# Patient Record
Sex: MALE | Race: BLACK OR AFRICAN AMERICAN | Employment: OTHER | URBAN - METROPOLITAN AREA
[De-identification: names, ages, dates, MRNs, and addresses within clinical notes are randomized per-mention and may not be internally consistent; named-entity substitution may affect disease eponyms.]

---

## 2017-03-23 ENCOUNTER — PRE VISIT (OUTPATIENT)
Dept: UROLOGY | Facility: CLINIC | Age: 52
End: 2017-03-23

## 2017-03-31 ENCOUNTER — OFFICE VISIT (OUTPATIENT)
Dept: UROLOGY | Facility: CLINIC | Age: 52
End: 2017-03-31

## 2017-03-31 VITALS
BODY MASS INDEX: 27.77 KG/M2 | DIASTOLIC BLOOD PRESSURE: 74 MMHG | WEIGHT: 183.2 LBS | SYSTOLIC BLOOD PRESSURE: 133 MMHG | HEART RATE: 70 BPM | HEIGHT: 68 IN

## 2017-03-31 DIAGNOSIS — R35.1 NOCTURIA: ICD-10-CM

## 2017-03-31 DIAGNOSIS — R97.20 ELEVATED PROSTATE SPECIFIC ANTIGEN (PSA): ICD-10-CM

## 2017-03-31 DIAGNOSIS — N40.0 ENLARGED PROSTATE: ICD-10-CM

## 2017-03-31 DIAGNOSIS — N40.0 ENLARGED PROSTATE: Primary | ICD-10-CM

## 2017-03-31 LAB — PSA SERPL-MCNC: 8.23 UG/L (ref 0–4)

## 2017-03-31 ASSESSMENT — PAIN SCALES - GENERAL: PAINLEVEL: NO PAIN (0)

## 2017-03-31 ASSESSMENT — ENCOUNTER SYMPTOMS: DIFFICULTY URINATING: 1

## 2017-03-31 NOTE — MR AVS SNAPSHOT
After Visit Summary   3/31/2017    Chaitanya Ovalles    MRN: [id number]           Patient Information     Date Of Birth          1965        Visit Information        Provider Department      3/31/2017 4:00 PM Nadine Mena MD [facility] and [facility] Prostate and Urologic C[facility]        Today's Diagnoses     Enlarged prostate    -  1    Elevated prostate specific antigen (PSA)        Nocturia          Care Instructions    PSA today. Schedule a prostate MRI and follow up with a cystoscopy.              Follow-ups after your visit        Your next 10 appointments already scheduled     Mar 31, 2017  5:30 PM CDT   LAB with  LAB   [facility] ([facility])    [address] [place] [id number] [phone]           Patient must bring picture ID.  Patient should be prepared to give a urine specimen  Please do not eat 10-12 hours before your appointment if you are coming in fasting for labs on lipids, cholesterol, or glucose (sugar).  Pregnant women should follow their Care Team instructions. Water with medications is okay. Do not drink coffee or other fluids.   If you have concerns about taking  your medications, please ask at office or if scheduling via Twyxt, send a message by clicking on Secure Messaging, Message Your Care Team.            Apr 06, 2017  7:00 PM CDT   (Arrive by 6:45 PM)   MR PROSTATE with [id number]   [facility] [place] [facility] ([facility])    [address] [place] [id number] [phone]           Take your medicines as usual, unless your doctor tells you not to. Bring a list of your current medicines to your exam (including vitamins, minerals and over-the-counter drugs). Also bring the results of similar scans you may have had.  Please remove any body piercings and hair extensions before you arrive.  Follow your doctor s orders. If you do not, we may have to  postpone your exam.  You will not have contrast for this exam. You do not need to do anything special to prepare.  The MRI machine uses a strong magnet. Please wear clothes without metal (snaps, zippers). A sweatsuit works well, or we may give you a hospital gown.   **IMPORTANT** THE INSTRUCTIONS BELOW ARE ONLY FOR THOSE PATIENTS WHO HAVE BEEN TOLD THEY WILL RECEIVE SEDATION OR GENERAL ANESTHESIA DURING THEIR MRI PROCEDURE:  IF YOU WILL RECEIVE SEDATION (take medicine to help you relax during your exam):   You must get the medicine from your doctor before you arrive. Bring the medicine to the exam. Do not take it at home.   Arrive one hour early. Bring someone who can take you home after the test. Your medicine will make you sleepy. After the exam, you may not drive, take a bus or take a taxi by yourself.   No eating 8 hours before your exam. You may have clear liquids up until 4 hours before your exam. (Clear liquids include water, clear tea, black coffee and fruit juice without pulp.)  IF YOU WILL RECEIVE ANESTHESIA (be asleep for your exam):   Arrive 1 1/2 hours early. Bring someone who can take you home after the test. You may not drive, take a bus or take a taxi by yourself.   No eating 8 hours before your exam. You may have clear liquids up until 4 hours before your exam. (Clear liquids include water, clear tea, black coffee and fruit juice without pulp.)   You will spend four to five hours in the recovery room.  Please call the Imaging Department at your exam site with any questions.            May 19, 2017  4:45 PM CDT   (Arrive by 4:30 PM)   Cystoscopy with Nadine Mena MD   [facility] and [facility] for Prostate and Urologic Cancers ([facility])    [address] [facility] [address] [phone]              Future tests that were ordered for you today     Open Future Orders        Priority Expected Expires Ordered    PSA tumor marker Routine   "3/31/2018 3/31/2017    MRI Prostate Routine  3/31/2018 3/31/2017            Who to contact     Please call your clinic at [phone] to:    Ask questions about your health    Make or cancel appointments    Discuss your medicines    Learn about your test results    Speak to your doctor   If you have compliments or concerns about an experience at your clinic, or if you wish to file a complaint, please contact [facility] Patient Relations at [phone] or email us at [email or web address]         Additional Information About Your Visit        [facility] [employer] [facility] Information     [employer] is an electronic gateway that provides easy, online access to your medical records. With [employer], you can request a clinic appointment, read your test results, renew a prescription or communicate with your care team.     To sign up for [employer] visit the website at [email or web address]   You will be asked to enter the access code listed below, as well as some personal information. Please follow the directions to create your username and password.     Your access code is: [email or web address]  Expires: 6/15/2017  6:30 AM     Your access code will  in 90 days. If you need help or a new code, please contact your [facility] or call [phone] for assistance.        Care EveryWhere ID     This is your Care EveryWhere ID. This could be used by other organizations to access your [place] medical records  [id number]        Your Vitals Were     Pulse Height BMI (Body Mass Index)             70 1.727 m (5' 8\") 27.86 kg/m2          Blood Pressure from Last 3 Encounters:   17 133/74    Weight from Last 3 Encounters:   17 83.1 kg (183 lb 3.2 oz)              We Performed the Following     POST-VOID RESIDUAL BLADDER SCAN        Primary Care Provider Office Phone # Fax #    Larry Mcdaniel [phone]       [employer] [place] [address] [place] [address]  [facility] [address]        Thank you!     Thank you for choosing [facility]OLOGY AND [facility] FOR PROSTATE AND UROLOGIC CANCERS  for your care. Our goal is always to provide you with excellent care. Hearing back from our patients is one way we can continue to improve our services. Please take a few minutes to complete the written survey that you may receive in the mail after your visit with us. Thank you!             Your Updated Medication List - Protect others around you: Learn how to safely use, store and throw away your medicines at www.disposemymeds.org.          This list is accurate as of: 3/31/17  5:18 PM.  Always use your most recent med list.                   Brand Name Dispense Instructions for use    sildenafil 100 MG cap/tab    VIAGRA    12 tablet    Take 1 tablet by mouth daily as needed for erectile dysfunction. at least 30 minutes before intercourse.       simvastatin 10 MG tablet    ZOCOR    1 Tab    declined, needs lipid panel       tamsulosin 80 mcg/mL Susp    FLOMAX     Take 400 mcg by mouth daily Reported on 3/31/2017

## 2017-03-31 NOTE — LETTER
3/31/2017       RE: Chaitanya Ovalles  [address] [address] [facility] [address]     Dear Colleague,    Thank you for referring your patient, Chaitanya Ovalles, to the [facility] UROLOGY AND INST FOR PROSTATE AND UROLOGIC CANCERS at [facility]. Please see a copy of my visit note below.    It was my pleasure to see Chaitanya Ovalles a 51 year old year old male today. Patient was seen in consultation for lower urinary tract symptoms.    HPI:      Patient presents for elevated PSA and lower urinary tract symptoms.  IN 2014, patient had elevated PSA (6.8) and had a biopsy that was negative.   He was followed by outside urologist. Had cystoscopy that did not show stricture or other significant findings. Had not yet had UDS.     Notes that he continues to have some lower urinary tract symptoms despite taking tamsulosin (8mg, per patient report).     No family history of prostate cancer     Past Medical History:   Diagnosis Date     BPH (benign prostatic hypertrophy) with urinary obstruction      Elevated PSA        No past surgical history on file.    No family history on file.    Current Outpatient Prescriptions   Medication Sig Dispense Refill     tamsulosin (FLOMAX) 80 mcg/mL Take 400 mcg by mouth daily Reported on 3/31/2017       sildenafil (VIAGRA) 100 MG tablet Take 1 tablet by mouth daily as needed for erectile dysfunction. at least 30 minutes before intercourse. (Patient not taking: Reported on 3/31/2017) 12 tablet 3     SIMVASTATIN 10 MG OR TABS declined, needs lipid panel (Patient not taking: No sig reported) 1 Tab 0       ALLERGIES: Review of patient's allergies indicates no known allergies.      REVIEW OF SYSTEMS:  Skin: negative  Eyes: negative  Ears/Nose/Throat: negative  Respiratory: No shortness of breath, dyspnea on exertion, cough, or hemoptysis  Cardiovascular: negative  Gastrointestinal: negative  Genitourinary: as above  Musculoskeletal: negative  Neurologic:  "negative  Psychiatric: negative  Hematologic/Lymphatic/Immunologic: negative  Endocrine: negative      GENERAL PHYSICAL EXAM:   Vitals: /74  Pulse 70  Ht 1.727 m (5' 8\")  Wt 83.1 kg (183 lb 3.2 oz)  BMI 27.86 kg/m2  Body mass index is 27.86 kg/(m^2).  Constitutional: healthy, alert and no distress  Head: Normocephalic  Neck: Neck supple  Cardiovascular: negative  Respiratory: negative  Gastrointestinal: Abdomen soft, non-tender  Musculoskeletal: extremities normal  Skin: no suspicious lesions or rashes  Neurologic: Gait normal  Psychiatric: affect normal/bright and mentation appears normal.       EXAM:   Left Flank: negative  Right Flank: negative  Inguinal Area: normal  Phallus is meatus normal and no plaques palpated.   Left testis descended, size is normal , consistency is normal. Intra-testicular masses  Right testis descended, size is normal, consistency is normal. Intra-testicular masses     RECTAL EXAM:   Size: 1+   Sphincter tone: normal  Tenderness: Absent  Rectal Mass: Absent  Prostate Nodule: Absent    Urinary Flow Rate  Residual Volume by Ultrasound: 45 mL    RADIOLOGY: The following tests were reviewed: Need to complete the following Radiology exams prior to the office appointment:  LABS: The last test results for Needs to complete the necessary tests prior to appointment: were reviewed.     ASSESSMENT: 52 y/o M with elevated PSA and lower urinary tract symptoms      PLAN:     Repeat PSA today   Prostate MRI   Continue tamsulosin   Schedule cystoscopy to evaluate   If MRI shows any significant lesions, will consider fusion biospy   Will consider UDS       I spent over 30 minutes with the patient.  Over half this time was spent on counseling for lower urinary tract symptoms and elevated PSA.          "

## 2017-03-31 NOTE — PROGRESS NOTES
"It was my pleasure to see Chaitanya Ovalles a 51 year old year old male today. Patient was seen in consultation for lower urinary tract symptoms.    HPI:      Patient presents for elevated PSA and lower urinary tract symptoms.  IN 2014, patient had elevated PSA (6.8) and had a biopsy that was negative.   He was followed by outside urologist. Had cystoscopy that did not show stricture or other significant findings. Had not yet had UDS.     Notes that he continues to have some lower urinary tract symptoms despite taking tamsulosin (8mg, per patient report).         No family history of prostate cancer     Past Medical History:   Diagnosis Date     BPH (benign prostatic hypertrophy) with urinary obstruction      Elevated PSA        No past surgical history on file.    No family history on file.    Current Outpatient Prescriptions   Medication Sig Dispense Refill     tamsulosin (FLOMAX) 80 mcg/mL Take 400 mcg by mouth daily Reported on 3/31/2017       sildenafil (VIAGRA) 100 MG tablet Take 1 tablet by mouth daily as needed for erectile dysfunction. at least 30 minutes before intercourse. (Patient not taking: Reported on 3/31/2017) 12 tablet 3     SIMVASTATIN 10 MG OR TABS declined, needs lipid panel (Patient not taking: No sig reported) 1 Tab 0       ALLERGIES: Review of patient's allergies indicates no known allergies.      REVIEW OF SYSTEMS:  Skin: negative  Eyes: negative  Ears/Nose/Throat: negative  Respiratory: No shortness of breath, dyspnea on exertion, cough, or hemoptysis  Cardiovascular: negative  Gastrointestinal: negative  Genitourinary: as above  Musculoskeletal: negative  Neurologic: negative  Psychiatric: negative  Hematologic/Lymphatic/Immunologic: negative  Endocrine: negative      GENERAL PHYSICAL EXAM:   Vitals: /74  Pulse 70  Ht 1.727 m (5' 8\")  Wt 83.1 kg (183 lb 3.2 oz)  BMI 27.86 kg/m2  Body mass index is 27.86 kg/(m^2).  Constitutional: healthy, alert and no distress  Head: " Normocephalic  Neck: Neck supple  Cardiovascular: negative  Respiratory: negative  Gastrointestinal: Abdomen soft, non-tender  Musculoskeletal: extremities normal  Skin: no suspicious lesions or rashes  Neurologic: Gait normal  Psychiatric: affect normal/bright and mentation appears normal.       EXAM:   Left Flank: negative  Right Flank: negative  Inguinal Area: normal  Phallus is meatus normal and no plaques palpated.   Left testis descended, size is normal , consistency is normal. Intra-testicular masses  Right testis descended, size is normal, consistency is normal. Intra-testicular masses     RECTAL EXAM:   Size: 1+   Sphincter tone: normal  Tenderness: Absent  Rectal Mass: Absent  Prostate Nodule: Absent    Urinary Flow Rate  Residual Volume by Ultrasound: 45 mL    RADIOLOGY: The following tests were reviewed: Need to complete the following Radiology exams prior to the office appointment:  LABS: The last test results for Needs to complete the necessary tests prior to appointment: were reviewed.     ASSESSMENT: 50 y/o M with elevated PSA and lower urinary tract symptoms      PLAN:     Repeat PSA today   Prostate MRI   Continue tamsulosin   Schedule cystoscopy to evaluate   If MRI shows any significant lesions, will consider fusion biospy   Will consider UDS         I spent over 30 minutes with the patient.  Over half this time was spent on counseling for lower urinary tract symptoms and elevated PSA.      Answers for HPI/ROS submitted by the patient on 3/31/2017   General Symptoms: No  Skin Symptoms: No  HENT Symptoms: No  EYE SYMPTOMS: No  HEART SYMPTOMS: No  LUNG SYMPTOMS: No  INTESTINAL SYMPTOMS: No  URINARY SYMPTOMS: Yes  REPRODUCTIVE SYMPTOMS: No  SKELETAL SYMPTOMS: No  BLOOD SYMPTOMS: No  NERVOUS SYSTEM SYMPTOMS: No  MENTAL HEALTH SYMPTOMS: No  Trouble holding urine or incontinence: Yes  Trouble starting or stopping: Yes  Increased frequency of urination: Yes  Frequent nighttime urination:  Yes  Difficulty emptying bladder: Yes

## 2017-04-14 ENCOUNTER — TELEPHONE (OUTPATIENT)
Dept: OTHER | Facility: CLINIC | Age: 52
End: 2017-04-14

## 2017-04-14 NOTE — TELEPHONE ENCOUNTER
4/14/2017    Call Regarding Onboarding [employer]    Attempt 1    Message on voicemail     Comments: 1 adult dep, patient was driving and could not talk, per patient will call back to onboard.      Outreach   samy

## 2017-05-01 ENCOUNTER — PRE VISIT (OUTPATIENT)
Dept: UROLOGY | Facility: CLINIC | Age: 52
End: 2017-05-01

## 2017-05-01 NOTE — TELEPHONE ENCOUNTER
Patient is coming in to see  for a cystoscopy for lower urinary tract symptoms, no need for a call very thing is in epic ready for appointment.

## 2017-05-19 ENCOUNTER — OFFICE VISIT (OUTPATIENT)
Dept: UROLOGY | Facility: CLINIC | Age: 52
End: 2017-05-19

## 2017-05-19 VITALS
SYSTOLIC BLOOD PRESSURE: 132 MMHG | WEIGHT: 187 LBS | DIASTOLIC BLOOD PRESSURE: 88 MMHG | HEART RATE: 64 BPM | HEIGHT: 68 IN | BODY MASS INDEX: 28.34 KG/M2

## 2017-05-19 DIAGNOSIS — R35.0 URINARY FREQUENCY: ICD-10-CM

## 2017-05-19 DIAGNOSIS — R97.20 ELEVATED PROSTATE SPECIFIC ANTIGEN (PSA): Primary | ICD-10-CM

## 2017-05-19 RX ORDER — CIPROFLOXACIN 500 MG/1
500 TABLET, FILM COATED ORAL 2 TIMES DAILY
Qty: 6 TABLET | Refills: 0 | Status: SHIPPED | OUTPATIENT
Start: 2017-05-19 | End: 2017-07-07

## 2017-05-19 ASSESSMENT — PAIN SCALES - GENERAL: PAINLEVEL: NO PAIN (0)

## 2017-05-19 NOTE — NURSING NOTE
Invasive Procedure Safety Checklist:    Procedure:     Action: Complete sections and checkboxes as appropriate.    Pre-procedure:  1. Patient ID Verified with 2 identifiers (Deborah and  or MRN) : YES    2. Procedure and site verified with patient/designee (when able) : YES    3. Accurate consent documentation in medical record : YES    4. H&P (or appropriate assessment) documented in medical record : YES  H&P must be up to 30 days prior to procedure an updated within 24 hours of                 Procedure as applicable.     5. Relevant diagnostic and radiology test results appropriately labeled and displayed as applicable : YES    6. Blood products, implants, devices, and/or special equipment available for the procedure as applicable : YES    7. Procedure site(s) marked with provider initials [Exclusions: None] : NO    8. Marking not required. Reason : Yes  Procedure does not require site marking    Time Out:     Time-Out performed immediately prior to starting procedure, including verbal and active participation of all team members addressing: YES    1. Correct patient identity.  2. Confirmed that the correct side and site are marked.  3. An accurate procedure to be done.  4. Agreement on the procedure to be done.  5. Correct patient position.  6. Relevant images and results are properly labeled and appropriately displayed.  7. The need to administer antibiotics or fluids for irrigation purposes during the procedure as applicable.  8. Safety precautions based on patient history or medication use.    During Procedure: Verification of correct person, site, and procedure occurs any time the responsibility for care of the patient is transferred to another member of the care team.

## 2017-05-19 NOTE — LETTER
5/19/2017       RE: Chaitanya Ovalles  [address] [address] [address]     Dear Colleague,    Thank you for referring your patient, Chaitanya Ovalles, to the [facility] UROLOGY AND INST FOR PROSTATE AND UROLOGIC CANCERS at [facility]. Please see a copy of my visit note below.      PRE-PROCEDURE DIAGNOSIS: lower urinary tract symptoms  POST-PROCEDURE DIAGNOSIS:   PROCEDURE: Cystoscopy  HISTORY: Chaitanya Ovalles is a 51 year old male with significant lower urinary tract symptoms. Also noted to have a PIRADS 3 lesion on MRI   REVIEW OF OFFICE STUDIES (e.g., uroflow or PVR):   DESCRIPTION OF PROCEDURE: After informed consent was obtained, the patient was brought to the procedure room where he was placed in the supine position with all pressure points well padded.  The penis and scrotum were prepped and draped in a sterile fashion. A flexible cystoscope was introduced through a well-lubricated urethra. Anterior urethra strictures were absent.   The urinary sphincter was intact.  The prostate demonstrated very large trilobar hypertrophy  Bladder neck was open.   Bladder signififcant for presence of the following:      Diverticuli: absent      Cellules: absent      Trabeculation: present 0-1      Tumors: absent      Stones: absent  The flexible cystoscope was removed and the findings were described to the patient.   ASSESSMENT AND PLAN: 51 year old male with very large trilobar hypertrophy. Will schedule for MRI guided biopsy after which (if negative) will schedule for TURP.   Nadine Mena MD

## 2017-05-19 NOTE — MR AVS SNAPSHOT
After Visit Summary   5/19/2017    Chaitanya Ovalles    MRN: [id number]           Patient Information     Date Of Birth          1965        Visit Information        Provider Department      5/19/2017 4:45 PM Nadine Mena MD [facility] and [facility] for Prostate and Urologic Cancers        Care Instructions      [facility] for Prostate and Urologic Cancers  MRI Fusion Prostate Bx Patient Instructions  What is MRI Fusion Prostate Bx?  The MRI fusion biopsy is used to target a specific area for sampling. It requires a needle guide to be inserted into the rectum next to the prostate. Next, MR images are viewed, the abnormal area is identified, and a needle is inserted through the guide to the targeted area for tissue samples.  How do I prepare for the exam?    Take Cipro 500mg one tablet in the morning and one in the evening starting the day prior to procedure x 3 days    You will be receiving a Gentamicin intramuscular injection in the Urology clinic 30 min prior to your procedure. Please plan to arrive 30 min earlier.      Do Fleets Enema 2 hrs prior to procedure (eat a light meal)    Stop NSAIDS/Aspirin 7 days prior to procedure. If you are taking any other anticoagulation medications such as Coumadin, Heparin, or Lovenox, please consult with your physician prior to scheduling the procedure    How long will procedure take?  MRI fusion biopsy will take about 1 hr. Please allow 2 hrs for the whole procedure (including pre and post)  When will I know my results?  We will schedule a 2 week follow up appointment for you to review your pathology results with your physician.   *Please arrive 30 min prior to your scheduled procedure time*  Who do I contact if I have questions?  Please call Urology and [facility] at [phone] Opt # 3 to speak to a nurse.         Follow-ups after your visit        Your next 10 appointments already scheduled     Jun 28, 2017  8:30 AM CDT   (Arrive by 8:15 AM)  "  Sonography/Biopsy with Nadine Mena MD   [facility] and [facility] for Prostate and Urologic Cancers ([facility])    [address]  [address] [facility] [address] [phone]              Who to contact     Please call your clinic at [phone] to:    Ask questions about your health    Make or cancel appointments    Discuss your medicines    Learn about your test results    Speak to your doctor   If you have compliments or concerns about an experience at your clinic, or if you wish to file a complaint, please contact [facility] Patient Relations at [phone] or email us at [email or web address]         Additional Information About Your Visit        [employer] Information     [employer] gives you secure access to your electronic health record. If you see a primary care provider, you can also send messages to your care team and make appointments. If you have questions, please call your primary care clinic.  If you do not have a primary care provider, please call [phone] and they will assist you.      [employer] is an electronic gateway that provides easy, online access to your medical records. With [employer], you can request a clinic appointment, read your test results, renew a prescription or communicate with your care team.     To access your existing account, please contact your [facility] or call [phone] for assistance.        Care EveryWhere ID     This is your Care EveryWhere ID. This could be used by other organizations to access your [place] medical records  [id number]        Your Vitals Were     Pulse Height BMI (Body Mass Index)             64 1.727 m (5' 8\") 28.43 kg/m2          Blood Pressure from Last 3 Encounters:   05/19/17 132/88   03/31/17 133/74    Weight from Last 3 Encounters:   05/19/17 84.8 kg (187 lb)   03/31/17 83.1 kg (183 lb 3.2 oz)              Today, you had the " following     No orders found for display         Today's Medication Changes          These changes are accurate as of: 5/19/17  5:33 PM.  If you have any questions, ask your nurse or doctor.               Stop taking these medicines if you haven't already. Please contact your care team if you have questions.     sildenafil 100 MG cap/tab   Commonly known as:  VIAGRA           simvastatin 10 MG tablet   Commonly known as:  ZOCOR           tamsulosin 80 mcg/mL Susp   Commonly known as:  FLOMAX                    Primary Care Provider Office Phone # Fax #    Larry Fawad Mcdaniel [phone]       [employer] [place] [address]  [facility] [address]        Thank you!     Thank you for choosing [facility] UROLOGY AND [facility] FOR PROSTATE AND UROLOGIC CANCERS  for your care. Our goal is always to provide you with excellent care. Hearing back from our patients is one way we can continue to improve our services. Please take a few minutes to complete the written survey that you may receive in the mail after your visit with us. Thank you!             Your Updated Medication List - Protect others around you: Learn how to safely use, store and throw away your medicines at www.disposemymeds.org.      Notice  As of 5/19/2017  5:33 PM    You have not been prescribed any medications.

## 2017-05-19 NOTE — PATIENT INSTRUCTIONS
[facility] for Prostate and Urologic Cancers  MRI Fusion Prostate Bx Patient Instructions  What is MRI Fusion Prostate Bx?  The MRI fusion biopsy is used to target a specific area for sampling. It requires a needle guide to be inserted into the rectum next to the prostate. Next, MR images are viewed, the abnormal area is identified, and a needle is inserted through the guide to the targeted area for tissue samples.  How do I prepare for the exam?    Take Cipro 500mg one tablet in the morning and one in the evening starting the day prior to procedure x 3 days    You will be receiving a Gentamicin intramuscular injection in the Urology clinic 30 min prior to your procedure. Please plan to arrive 30 min earlier.      Do Fleets Enema 2 hrs prior to procedure (eat a light meal)    Stop NSAIDS/Aspirin 7 days prior to procedure. If you are taking any other anticoagulation medications such as Coumadin, Heparin, or Lovenox, please consult with your physician prior to scheduling the procedure    How long will procedure take?  MRI fusion biopsy will take about 1 hr. Please allow 2 hrs for the whole procedure (including pre and post)  When will I know my results?  We will schedule a 2 week follow up appointment for you to review your pathology results with your physician.   *Please arrive 30 min prior to your scheduled procedure time*  Who do I contact if I have questions?  Please call Urology and [facility] at [phone] Opt # 3 to speak to a nurse.

## 2017-05-19 NOTE — NURSING NOTE
"Chief Complaint   Patient presents with     Cystoscopy     LUTS       Height 1.727 m (5' 8\"), weight 84.8 kg (187 lb). Body mass index is 28.43 kg/(m^2).    Patient Active Problem List   Diagnosis     Erectile dysfunction     Hyperlipidemia     HYPERLIPIDEMIA LDL GOAL <130       No Known Allergies    No current outpatient prescriptions on file.       Social History   Substance Use Topics     Smoking status: Never Smoker     Smokeless tobacco: Not on file     Alcohol use Not on file       BELLA Monzon  5/19/2017  5:02 PM       "

## 2017-05-22 NOTE — PROGRESS NOTES
PRE-PROCEDURE DIAGNOSIS: lower urinary tract symptoms  POST-PROCEDURE DIAGNOSIS:   PROCEDURE: Cystoscopy  HISTORY: Chaitanya Ovalles is a 51 year old male with significant lower urinary tract symptoms. Also noted to have a PIRADS 3 lesion on MRI   REVIEW OF OFFICE STUDIES (e.g., uroflow or PVR):   DESCRIPTION OF PROCEDURE: After informed consent was obtained, the patient was brought to the procedure room where he was placed in the supine position with all pressure points well padded.  The penis and scrotum were prepped and draped in a sterile fashion. A flexible cystoscope was introduced through a well-lubricated urethra. Anterior urethra strictures were absent.   The urinary sphincter was intact.  The prostate demonstrated very large trilobar hypertrophy  Bladder neck was open.   Bladder signififcant for presence of the following:      Diverticuli: absent      Cellules: absent      Trabeculation: present 0-1      Tumors: absent      Stones: absent  The flexible cystoscope was removed and the findings were described to the patient.   ASSESSMENT AND PLAN: 51 year old male with very large trilobar hypertrophy. Will schedule for MRI guided biopsy after which (if negative) will schedule for TURP.

## 2017-06-23 ENCOUNTER — TELEPHONE (OUTPATIENT)
Dept: UROLOGY | Facility: CLINIC | Age: 52
End: 2017-06-23

## 2017-06-23 NOTE — TELEPHONE ENCOUNTER
Patient coming in for prostate biopsy. Patient contacted regarding prep needed to be done prior to biopsy. This includes stopping all blood thinners for 1 week prior to biopsy, obtaining and administering fleets enema 2 hours prior to biopsy. Patient will also receive 3 days of Cipro 500 mg. Patient stated understanding and had no further questions.

## 2017-06-26 ENCOUNTER — PRE VISIT (OUTPATIENT)
Dept: UROLOGY | Facility: CLINIC | Age: 52
End: 2017-06-26

## 2017-06-26 NOTE — TELEPHONE ENCOUNTER
Follow up- elevated PSA with MRI fusion bx.  Prep given to patient on 5-19-17.  Records available in [facility].

## 2017-06-28 ENCOUNTER — OFFICE VISIT (OUTPATIENT)
Dept: UROLOGY | Facility: CLINIC | Age: 52
End: 2017-06-28

## 2017-06-28 VITALS
WEIGHT: 178 LBS | DIASTOLIC BLOOD PRESSURE: 79 MMHG | SYSTOLIC BLOOD PRESSURE: 152 MMHG | BODY MASS INDEX: 26.98 KG/M2 | HEART RATE: 67 BPM | HEIGHT: 68 IN

## 2017-06-28 DIAGNOSIS — R97.20 ELEVATED PROSTATE SPECIFIC ANTIGEN (PSA): Primary | ICD-10-CM

## 2017-06-28 ASSESSMENT — PAIN SCALES - GENERAL: PAINLEVEL: NO PAIN (0)

## 2017-06-28 NOTE — NURSING NOTE
Invasive Procedure Safety Checklist:    Procedure: MRI Fusion biopsy- Prostate    Action: Complete sections and checkboxes as appropriate.    Pre-procedure:  1. Patient ID Verified with 2 identifiers (Deborah and  or MRN) : YES    2. Procedure and site verified with patient/designee (when able) : YES    3. Accurate consent documentation in medical record : YES    4. H&P (or appropriate assessment) documented in medical record : NO  H&P must be up to 30 days prior to procedure an updated within 24 hours of                 Procedure as applicable.     5. Relevant diagnostic and radiology test results appropriately labeled and displayed as applicable : NO    6. Blood products, implants, devices, and/or special equipment available for the procedure as applicable : NO    7. Procedure site(s) marked with provider initials [Exclusions: ] : NO    8. Marking not required. Reason : Yes  Procedure does not require site marking    Time Out:     Time-Out performed immediately prior to starting procedure, including verbal and active participation of all team members addressing: YES    1. Correct patient identity.  2. Confirmed that the correct side and site are marked.  3. An accurate procedure to be done.  4. Agreement on the procedure to be done.  5. Correct patient position.  6. Relevant images and results are properly labeled and appropriately displayed.  7. The need to administer antibiotics or fluids for irrigation purposes during the procedure as applicable.  8. Safety precautions based on patient history or medication use.    During Procedure: Verification of correct person, site, and procedure occurs any time the responsibility for care of the patient is transferred to another member of the care team.    BELLA Craig

## 2017-06-28 NOTE — MR AVS SNAPSHOT
After Visit Summary   6/28/2017    Chaitanya Ovalles    MRN: [id number]           Patient Information     Date Of Birth          1965        Visit Information        Provider Department      6/28/2017 10:30 AM Nadine Mena MD [facility]rology and [facility] for Prostate and Urologic Cancers        Today's Diagnoses     Elevated prostate specific antigen (PSA)    -  1      Care Instructions    Follow up with Dr. Mena to discuss results of prostate biopsy.    It was a pleasure meeting with you today.  Thank you for allowing me and my team the privilege of caring for you today.  YOU are the reason we are here, and I truly hope we provided you with the excellent service you deserve.  Please let us know if there is anything else we can do for you so that we can be sure you are leaving completely satisfied with your care experience.      Nica Vogt ADITHYA          Follow-ups after your visit        Your next 10 appointments already scheduled     Jul 07, 2017  1:30 PM CDT   (Arrive by 1:15 PM)   Return Visit with Nadine Mena MD   [facility]rology and [facility] for Prostate and Urologic Cancers ([facility])    [address] [place] [address] [phone]              Future tests that were ordered for you today     Open Future Orders        Priority Expected Expires Ordered    US Transrectal (In Clinic) Routine  6/28/2018 6/28/2017            Who to contact     Please call your clinic at [phone] to:    Ask questions about your health    Make or cancel appointments    Discuss your medicines    Learn about your test results    Speak to your doctor   If you have compliments or concerns about an experience at your clinic, or if you wish to file a complaint, please contact [facility] Patient Relations at [phone] or email us at [email or web address]         Additional Information About Your  "Visit        [employer] Information     [employer] gives you secure access to your electronic health record. If you see a primary care provider, you can also send messages to your care team and make appointments. If you have questions, please call your primary care clinic.  If you do not have a primary care provider, please call [phone] and they will assist you.      [employer] is an electronic gateway that provides easy, online access to your medical records. With [employer], you can request a clinic appointment, read your test results, renew a prescription or communicate with your care team.     To access your existing account, please contact your [facility] or call [phone] for assistance.        Care EveryWhere ID     This is your Care EveryWhere ID. This could be used by other organizations to access your [place] medical records  [id number]        Your Vitals Were     Pulse Height BMI (Body Mass Index)             67 1.727 m (5' 8\") 27.06 kg/m2          Blood Pressure from Last 3 Encounters:   06/28/17 152/79   05/19/17 132/88   03/31/17 133/74    Weight from Last 3 Encounters:   06/28/17 80.7 kg (178 lb)   05/19/17 84.8 kg (187 lb)   03/31/17 83.1 kg (183 lb 3.2 oz)              We Performed the Following     BIOPSY PROSTATE NEEDLE/PUNCH (In Clinic)     MRI Fusion (In Clinic)        Primary Care Provider Office Phone # Fax #    Larry Mcdaniel [phone]       [facility] [address]  [facility] [address]        Equal Access to Services     MICHAEL LEE : Hadii aad ku hadasho Soomaali, waaxda luqadaha, qaybta kaalmada adeegyada, lasha byers . So [facility] [phone].    ATENCIÓN: Si habla español, tiene a pimentel disposición servicios gratuitos de asistencia lingüística. Llame al [phone].    We comply with applicable federal civil rights laws and [place] laws. We do not discriminate on the basis of race, color, " national origin, age, disability sex, sexual orientation or gender identity.            Thank you!     Thank you for choosing [facility] UROLOGY AND [facility] FOR PROSTATE AND UROLOGIC CANCERS  for your care. Our goal is always to provide you with excellent care. Hearing back from our patients is one way we can continue to improve our services. Please take a few minutes to complete the written survey that you may receive in the mail after your visit with us. Thank you!             Your Updated Medication List - Protect others around you: Learn how to safely use, store and throw away your medicines at www.disposemymeds.org.          This list is accurate as of: 6/28/17 11:34 AM.  Always use your most recent med list.                   Brand Name Dispense Instructions for use Diagnosis    ciprofloxacin 500 MG tablet    CIPRO    6 tablet    Take 1 tablet (500 mg) by mouth 2 times daily    Elevated prostate specific antigen (PSA)

## 2017-06-28 NOTE — PROGRESS NOTES
Here for an MRI-ultrasound-fusion guided biopsy of the prostate    We previously obtained an MRI of the prostate and identified all PIRADS 3-5 lesions for targeting    Target Lesion #1 left mid (PIRADS 3)       PSA   Date Value Ref Range Status   03/31/2017 8.23 (H) 0 - 4 ug/L Final     Comment:     Assay Method:  Chemiluminescence using Siemens Vista analyzer       An enema was completed and 500 mg of Cipro was given prior to the biopsy.  After obtaining informed consent patient was placed in lateral decubitus position.  The ultrasound probe was placed in the rectum.  The prostate was numbed using ultrasound guidance with 1% lidocaine 5 mls along each nerve bundle.  The volume was measured and estimated to be 86 grams.  US images were obtained and then fused with the MRI images.  The fused images were then used to guide the biopsy of the targeted lesions with 3 cores taken of the lesion.  We then performed random biopsies.  12 cores were taken with 6 on each side, base, mid and apex.  The patient tolerated the procedure well.      Follow up in ~ 1-2 weeks to review pathology results

## 2017-06-28 NOTE — NURSING NOTE
The following medication was given:     MEDICATION: Gentamicin  ROUTE: IM  SITE: RUQ - Gluteus  DOSE: 80 mg  LOT #: [id number]  :  [employer]  EXPIRATION DATE:  08/2018  NDC#: 04829-451-04    Patient tolerated injection well.    BELLA Craig

## 2017-06-28 NOTE — PATIENT INSTRUCTIONS
Follow up with Dr. Mena to discuss results of prostate biopsy.    It was a pleasure meeting with you today.  Thank you for allowing me and my team the privilege of caring for you today.  YOU are the reason we are here, and I truly hope we provided you with the excellent service you deserve.  Please let us know if there is anything else we can do for you so that we can be sure you are leaving completely satisfied with your care experience.      FRANKIE CraigA

## 2017-06-28 NOTE — LETTER
6/28/2017       RE: Chaitanya Ovalles  [address] [address] [facility] [address]     Dear Colleague,    Thank you for referring your patient, Chaitanya Ovalles, to the [facility] UROLOGY AND INST FOR PROSTATE AND UROLOGIC CANCERS at [facility]. Please see a copy of my visit note below.    Here for an MRI-ultrasound-fusion guided biopsy of the prostate    We previously obtained an MRI of the prostate and identified all PIRADS 3-5 lesions for targeting    Target Lesion #1 left mid (PIRADS 3)     PSA   Date Value Ref Range Status   03/31/2017 8.23 (H) 0 - 4 ug/L Final     Comment:     Assay Method:  Chemiluminescence using Siemens Vista analyzer     An enema was completed and 500 mg of Cipro was given prior to the biopsy.  After obtaining informed consent patient was placed in lateral decubitus position.  The ultrasound probe was placed in the rectum.  The prostate was numbed using ultrasound guidance with 1% lidocaine 5 mls along each nerve bundle.  The volume was measured and estimated to be 86 grams.  US images were obtained and then fused with the MRI images.  The fused images were then used to guide the biopsy of the targeted lesions with 3 cores taken of the lesion.  We then performed random biopsies.  12 cores were taken with 6 on each side, base, mid and apex.  The patient tolerated the procedure well.      Follow up in ~ 1-2 weeks to review pathology results   Nadine Mena MD

## 2017-06-29 LAB — COPATH REPORT: NORMAL

## 2017-07-07 ENCOUNTER — TELEPHONE (OUTPATIENT)
Dept: UROLOGY | Facility: CLINIC | Age: 52
End: 2017-07-07

## 2017-07-07 ENCOUNTER — OFFICE VISIT (OUTPATIENT)
Dept: UROLOGY | Facility: CLINIC | Age: 52
End: 2017-07-07

## 2017-07-07 VITALS
DIASTOLIC BLOOD PRESSURE: 79 MMHG | HEIGHT: 68 IN | BODY MASS INDEX: 28.11 KG/M2 | SYSTOLIC BLOOD PRESSURE: 134 MMHG | WEIGHT: 185.5 LBS | HEART RATE: 60 BPM

## 2017-07-07 DIAGNOSIS — R35.1 NOCTURIA: ICD-10-CM

## 2017-07-07 DIAGNOSIS — R97.20 ELEVATED PROSTATE SPECIFIC ANTIGEN (PSA): ICD-10-CM

## 2017-07-07 DIAGNOSIS — R35.0 URINARY FREQUENCY: Primary | ICD-10-CM

## 2017-07-07 RX ORDER — CEFAZOLIN SODIUM 1 G/3ML
1 INJECTION, POWDER, FOR SOLUTION INTRAMUSCULAR; INTRAVENOUS SEE ADMIN INSTRUCTIONS
Status: CANCELLED | OUTPATIENT
Start: 2017-07-07

## 2017-07-07 ASSESSMENT — PAIN SCALES - GENERAL: PAINLEVEL: NO PAIN (0)

## 2017-07-07 NOTE — NURSING NOTE
"Chief Complaint   Patient presents with     RECHECK     Biopsy results       Blood pressure 134/79, pulse 60, height 1.727 m (5' 8\"), weight 84.1 kg (185 lb 8 oz). Body mass index is 28.21 kg/(m^2).    Patient Active Problem List   Diagnosis     Erectile dysfunction     Hyperlipidemia     HYPERLIPIDEMIA LDL GOAL <130       No Known Allergies    No current outpatient prescriptions on file.       Social History   Substance Use Topics     Smoking status: Never Smoker     Smokeless tobacco: Not on file     Alcohol use Not on file       BELLA Monzon  7/7/2017  1:43 PM       "

## 2017-07-07 NOTE — PROGRESS NOTES
"UROLOGIC DIAGNOSES:       CURRENT INTERVENTIONS:       HISTORY:     Patient presents for elevated PSA and lower urinary tract symptoms.  IN 2014, patient had elevated PSA (6.8) and had a biopsy that was negative.   He was followed by outside urologist. Had cystoscopy that did not show stricture or other significant findings. Had not yet had UDS.      Notes that he continues to have some lower urinary tract symptoms despite taking tamsulosin (8mg, per patient report).     Patient had cystoscopy that showed significant trilobar hypertrophy.   Patient s/p prostate biopsy for PIRADS 3 lesion and elevated PSA. No evidence of prostate cancer on the specimen.       We discussed prostate biopsy results, elevated PSA, outlet procedures including TURP, Rezum, PVP.         PAST MEDICAL HISTORY:   Past Medical History:   Diagnosis Date     BPH (benign prostatic hypertrophy) with urinary obstruction      Elevated PSA        PAST SURGICAL HISTORY: No past surgical history on file.    FAMILY HISTORY: No family history on file.    SOCIAL HISTORY:   Social History   Substance Use Topics     Smoking status: Never Smoker     Smokeless tobacco: Not on file     Alcohol use Not on file       No current outpatient prescriptions on file.     No current facility-administered medications for this visit.          PHYSICAL EXAM:    /79  Pulse 60  Ht 1.727 m (5' 8\")  Wt 84.1 kg (185 lb 8 oz)  BMI 28.21 kg/m2    HEENT: Normocephalic and atraumatic   Cardiac: Not done  Back/Flank: Not done  CNS/PNS: Not done  Respiratory: Normal non-labored breathing  Abdomen: Soft nontender and nondistended  Peripheral Vascular: Not done  Mental Status: Not done    Penis: Not done  Scrotal Skin: Not done  Testicles: Not done  Epididymis: Not done  Digital Rectal Exam:     Cystoscopy: Not done    Imaging: None    Urinalysis: UA RESULTS:  No results for input(s): COLOR, APPEARANCE, URINEGLC, URINEBILI, URINEKETONE, SG, UBLD, URINEPH, PROTEIN, " UROBILINOGEN, NITRITE, LEUKEST, RBCU, WBCU in the last 11732 hours.    PSA:     Post Void Residual:     Other labs: None today      IMPRESSION:  52 y/o M with elevated PSA, lower urinary tract symptoms despite medication, significant trilobar hypertrophy       PLAN:  Schedule for TURP in 09/2017    Total Time: 15 minutes                                     Total in Consultation: greater than 50%

## 2017-07-07 NOTE — LETTER
"7/7/2017       RE: Chaitanya Ovalles  [address] [facility] [address]     Dear Colleague,    Thank you for referring your patient, Chaitanya Ovalles, to the [facility] UROLOGY AND INST FOR PROSTATE AND UROLOGIC CANCERS at [facility]. Please see a copy of my visit note below.    UROLOGIC DIAGNOSES:       CURRENT INTERVENTIONS:       HISTORY:     Patient presents for elevated PSA and lower urinary tract symptoms.  IN 2014, patient had elevated PSA (6.8) and had a biopsy that was negative.   He was followed by outside urologist. Had cystoscopy that did not show stricture or other significant findings. Had not yet had UDS.      Notes that he continues to have some lower urinary tract symptoms despite taking tamsulosin (8mg, per patient report).     Patient had cystoscopy that showed significant trilobar hypertrophy.   Patient s/p prostate biopsy for PIRADS 3 lesion and elevated PSA. No evidence of prostate cancer on the specimen.       We discussed prostate biopsy results, elevated PSA, outlet procedures including TURP, Rezum, PVP.         PAST MEDICAL HISTORY:   Past Medical History:   Diagnosis Date     BPH (benign prostatic hypertrophy) with urinary obstruction      Elevated PSA        PAST SURGICAL HISTORY: No past surgical history on file.    FAMILY HISTORY: No family history on file.    SOCIAL HISTORY:   Social History   Substance Use Topics     Smoking status: Never Smoker     Smokeless tobacco: Not on file     Alcohol use Not on file       No current outpatient prescriptions on file.     No current facility-administered medications for this visit.      PHYSICAL EXAM:  /79  Pulse 60  Ht 1.727 m (5' 8\")  Wt 84.1 kg (185 lb 8 oz)  BMI 28.21 kg/m2    HEENT: Normocephalic and atraumatic   Cardiac: Not done  Back/Flank: Not done  CNS/PNS: Not done  Respiratory: Normal non-labored breathing  Abdomen: Soft nontender and nondistended  Peripheral Vascular: Not " done  Mental Status: Not done    Penis: Not done  Scrotal Skin: Not done  Testicles: Not done  Epididymis: Not done  Digital Rectal Exam:     Cystoscopy: Not done    Imaging: None    Urinalysis: UA RESULTS:  No results for input(s): COLOR, APPEARANCE, URINEGLC, URINEBILI, URINEKETONE, SG, UBLD, URINEPH, PROTEIN, UROBILINOGEN, NITRITE, LEUKEST, RBCU, WBCU in the last 15453 hours.    PSA:     Post Void Residual:     Other labs: None today    IMPRESSION:  50 y/o M with elevated PSA, lower urinary tract symptoms despite medication, significant trilobar hypertrophy       PLAN:  Schedule for TURP in 09/2017    Total Time: 15 minutes                                     Total in Consultation: greater than 50%     Again, thank you for allowing me to participate in the care of your patient.      Sincerely,    Nadine Mena MD

## 2017-07-07 NOTE — TELEPHONE ENCOUNTER
Spoke with patient in clinic with surgery date, time and location.  Patient was given surgery packet and surgical soap.  Informed patient that he will get a phone call from RN Care Coordinator to discuss surgery.

## 2017-09-07 ENCOUNTER — ALLIED HEALTH/NURSE VISIT (OUTPATIENT)
Dept: SURGERY | Facility: CLINIC | Age: 52
End: 2017-09-07

## 2017-09-07 ENCOUNTER — OFFICE VISIT (OUTPATIENT)
Dept: SURGERY | Facility: CLINIC | Age: 52
End: 2017-09-07

## 2017-09-07 ENCOUNTER — ANESTHESIA EVENT (OUTPATIENT)
Dept: SURGERY | Facility: CLINIC | Age: 52
End: 2017-09-07
Payer: COMMERCIAL

## 2017-09-07 VITALS
BODY MASS INDEX: 27.96 KG/M2 | HEIGHT: 68 IN | WEIGHT: 184.5 LBS | RESPIRATION RATE: 16 BRPM | SYSTOLIC BLOOD PRESSURE: 132 MMHG | TEMPERATURE: 98 F | DIASTOLIC BLOOD PRESSURE: 78 MMHG | HEART RATE: 88 BPM | OXYGEN SATURATION: 99 %

## 2017-09-07 DIAGNOSIS — Z01.818 PREOP GENERAL PHYSICAL EXAM: ICD-10-CM

## 2017-09-07 DIAGNOSIS — Z01.818 PREOP GENERAL PHYSICAL EXAM: Primary | ICD-10-CM

## 2017-09-07 LAB
ALBUMIN UR-MCNC: NEGATIVE MG/DL
ANION GAP SERPL CALCULATED.3IONS-SCNC: 6 MMOL/L (ref 3–14)
APPEARANCE UR: CLEAR
BILIRUB UR QL STRIP: NEGATIVE
BUN SERPL-MCNC: 18 MG/DL (ref 7–30)
CALCIUM SERPL-MCNC: 8.6 MG/DL (ref 8.5–10.1)
CHLORIDE SERPL-SCNC: 105 MMOL/L (ref 94–109)
CO2 SERPL-SCNC: 28 MMOL/L (ref 20–32)
COLOR UR AUTO: YELLOW
CREAT SERPL-MCNC: 0.96 MG/DL (ref 0.66–1.25)
ERYTHROCYTE [DISTWIDTH] IN BLOOD BY AUTOMATED COUNT: 12.8 % (ref 10–15)
GFR SERPL CREATININE-BSD FRML MDRD: 83 ML/MIN/1.7M2
GLUCOSE SERPL-MCNC: 106 MG/DL (ref 70–99)
GLUCOSE UR STRIP-MCNC: NEGATIVE MG/DL
HCT VFR BLD AUTO: 41.8 % (ref 40–53)
HGB BLD-MCNC: 14 G/DL (ref 13.3–17.7)
HGB UR QL STRIP: NEGATIVE
KETONES UR STRIP-MCNC: NEGATIVE MG/DL
LEUKOCYTE ESTERASE UR QL STRIP: NEGATIVE
MCH RBC QN AUTO: 26.9 PG (ref 26.5–33)
MCHC RBC AUTO-ENTMCNC: 33.5 G/DL (ref 31.5–36.5)
MCV RBC AUTO: 80 FL (ref 78–100)
NITRATE UR QL: NEGATIVE
PH UR STRIP: 5 PH (ref 5–7)
PLATELET # BLD AUTO: 226 10E9/L (ref 150–450)
POTASSIUM SERPL-SCNC: 3.9 MMOL/L (ref 3.4–5.3)
RBC # BLD AUTO: 5.21 10E12/L (ref 4.4–5.9)
SODIUM SERPL-SCNC: 139 MMOL/L (ref 133–144)
SOURCE: NORMAL
SP GR UR STRIP: 1.02 (ref 1–1.03)
UROBILINOGEN UR STRIP-MCNC: 0 MG/DL (ref 0–2)
WBC # BLD AUTO: 5 10E9/L (ref 4–11)

## 2017-09-07 NOTE — PATIENT INSTRUCTIONS
Preparing for Your Surgery      Name:  Chaitanya Ovalles   MRN:  [id number]   :  1965   Today's Date:  2017     Arriving for surgery:  Surgery date:  17  Surgery time:  5:15 PM   Arrival time:  3:15 pm  Please come to:       [facility] [address]  [address] [place]  [address]    -   parking is available in front of the hospital from 5:15 am to 8:00 pm    -  Stop at the Information Desk in the lobby    -   Inform the information person that you are here for surgery. An escort to 3c will be provided. If you would not like an escort, please proceed to 3C on the 3rd floor. [phone]     What can I eat or drink?  -  You may have solid food or milk products until 8 hours prior to your surgery. (9:15 am)  -  You may have water, apple juice or 7up/Sprite until 2 hours prior to your surgery. (3:15 pm)    Which medicines can I take?  -  Do NOT take Aspirin for one week or Ibuprofen for 24 hrs BEFORE SURGERY      -  Please take these medications the day of surgery: none      How do I prepare myself?  -  Take two showers: one the night before surgery; and one the morning of surgery.         Use Scrubcare or Hibiclens to wash from neck down.  You may use your own shampoo and conditioner. No other hair products.   -  Do NOT use lotion, powder, deodorant, or antiperspirant the day of your surgery.  -  Do NOT wear any makeup, fingernail polish or jewelry.  -  Do not bring your own medications to the hospital, except for inhalers and eye drops.  -  Bring your ID and insurance card.    Questions or Concerns:  If you have questions or concerns, please call the  Preoperative Assessment Center, Monday-Friday 7AM-7PM:  [phone]                AFTER YOUR SURGERY  Breathing exercises   Breathing exercises help you recover faster. Take deep breaths and let the air out slowly. This will:     Help you wake up after surgery.    Help prevent complications like pneumonia.   Preventing complications will help you go home sooner.   We may give you a breathing device (incentive spirometer) to encourage you to breathe deeply.   Nausea and vomiting   You may feel sick to your stomach after surgery; if so, let your nurse know.    Pain control:  After surgery, you may have pain. Our goal is to help you manage your pain. Pain medicine will help you feel comfortable enough to do activities that will help you heal.  These activities may include breathing exercises, walking and physical therapy.   To help your health care team treat your pain we will ask: 1) If you have pain  2) where it is located 3) describe your pain in your words  Methods of pain control include medications given by mouth, vein or by nerve block for some surgeries.  Sequential Compression Device (SCD) or Pneumo Boots:  You may need to wear SCD S on your legs or feet. These are wraps connected to a machine that pumps in air and releases it. The repeated pumping helps prevent blood clots from forming.

## 2017-09-07 NOTE — H&P
Pre-Operative H & P     CC:  Preoperative exam to assess for increased cardiopulmonary risk while undergoing surgery and anesthesia.    Date of Encounter: 9/7/2017  Primary Care Physician:  Larry Mcdaniel    [facility]  Chaitanya Ovalles is a 51 year old male who presents for pre-operative H & P in preparation for Cystoscopy, Transurethral Resection of the Prostate with Dr. Mena on 9/18/2017 at [facility].     History is obtained from the patient.   BPH with symptoms of frequency. Elevated PSA in 2014, but biopsy negative.      Past Medical History  Past Medical History:   Diagnosis Date     BPH (benign prostatic hypertrophy) with urinary obstruction      Elevated PSA        Past Surgical History  Past Surgical History:   Procedure Laterality Date     NO HISTORY OF SURGERY         Hx of Blood transfusions/reactions: none     Hx of abnormal bleeding or anti-platelet use: none    Menstrual history: No LMP for male patient.:     Steroid use in the last year: none    Personal or FH with difficulty with Anesthesia:  No history of anesthesia        Prior to Admission Medications  No current outpatient prescriptions on file.       Allergies  No Known Allergies    Social History  Social History     Social History     Marital status:      Spouse name: N/A     Number of children: N/A     Years of education: N/A     Occupational History     Not on file.     Social History Main Topics     Smoking status: Never Smoker     Smokeless tobacco: Never Used     Alcohol use Not on file      Comment: social     Drug use: Not on file     Sexual activity: Not on file     Other Topics Concern     Not on file     Social History Narrative       Family History  No family history on file.        Anesthesia Evaluation     . Pt has not had prior anesthetic          ROS/MED HX    ENT/Pulmonary:  - neg pulmonary ROS     Neurologic:  - neg neurologic ROS     Cardiovascular:  - neg cardiovascular  "ROS       METS/Exercise Tolerance:  >4 METS   Hematologic:  - neg hematologic  ROS       Musculoskeletal:  - neg musculoskeletal ROS       GI/Hepatic:  - neg GI/hepatic ROS       Renal/Genitourinary:     (+) BPH,       Endo:  - neg endo ROS       Psychiatric:  - neg psychiatric ROS       Infectious Disease:  - neg infectious disease ROS       Malignancy:      - no malignancy   Other:    (+) No chance of pregnancy C-spine cleared: N/A, no H/O Chronic Pain,no other significant disability   - neg other ROS           Physical Exam  Normal systems: cardiovascular, pulmonary and dental    Airway   Mallampati: II  TM distance: >3 FB  Neck ROM: full    Dental   Normal    Cardiovascular   Rhythm and rate: regular and normal      Pulmonary   Clear to auscultation             The complete review of systems is negative other than noted in the HPI or here.                         184 lbs 8 oz  5' 8\"   Body mass index is 28.05 kg/(m^2).       Physical Exam  Constitutional: Awake, alert, cooperative, no apparent distress, and appears stated age.  Eyes: Pupils equal, round and reactive to light, extra ocular muscles intact, sclera clear, conjunctiva normal.  HENT: Normocephalic, oral pharynx with moist mucus membranes, good dentition. No goiter appreciated.   Respiratory: Clear to auscultation bilaterally, no crackles or wheezing.  Cardiovascular: Regular rate and rhythm, normal S1 and S2, and no murmur noted.  Carotids +2, no bruits. No edema. Palpable pulses to radial  DP and PT arteries.   GI: Normal bowel sounds, soft, non-distended, non-tender, no masses palpated, no hepatosplenomegaly.  Surgical scars: none  Lymph/Hematologic: No cervical lymphadenopathy and no supraclavicular lymphadenopathy.  Genitourinary:  BPH  Skin: Warm and dry.  No rashes at anticipated surgical site.   Musculoskeletal: Full ROM of neck. There is no redness, warmth, or swelling of the joints. Gross motor strength is normal.    Neurologic: Awake, alert, " oriented to name, place and time. Cranial nerves II-XII are grossly intact. Gait is normal.   Neuropsychiatric: Calm, cooperative. Normal affect.     Labs: (personally reviewed)  Lab Results   Component Value Date    WBC 5.0 2017     Lab Results   Component Value Date    RBC 5.21 2017     Lab Results   Component Value Date    HGB 14.0 2017     Lab Results   Component Value Date    HCT 41.8 2017     No components found for: MCT  Lab Results   Component Value Date    MCV 80 2017     Lab Results   Component Value Date    MCH 26.9 2017     Lab Results   Component Value Date    MCHC 33.5 2017     Lab Results   Component Value Date    RDW 12.8 2017     Lab Results   Component Value Date     2017       Last Basic Metabolic Panel:  Lab Results   Component Value Date     2017      Lab Results   Component Value Date    POTASSIUM 3.9 2017     Lab Results   Component Value Date    CHLORIDE 105 2017     Lab Results   Component Value Date    KELI 8.6 2017     Lab Results   Component Value Date    CO2 28 2017     Lab Results   Component Value Date    BUN 18 2017     Lab Results   Component Value Date    CR 0.96 2017     Lab Results   Component Value Date     2017         UA RESULTS:  Recent Labs   Lab Test  17   1609   COLOR  Yellow   APPEARANCE  Clear   URINEGLC  Negative   URINEBILI  Negative   URINEKETONE  Negative   SG  1.018   UBLD  Negative   URINEPH  5.0   PROTEIN  Negative   NITRITE  Negative   LEUKEST  Negative       EKG: Personally reviewed but formal cardiology read pendin2017 NSR        ASSESSMENT and PLAN  Chaitanya Ovalles is a 51 year old male scheduled to undergo Cystoscopy, Transurethral Resection of the Prostate. He has the following specific operative considerations:   - RCRI : Low serious cardiac risks.  0.4% risk of major adverse cardiac event.   - Anesthesia considerations:   Refer to PAC assessment in anesthesia records  - VTE risk: low risk  - VALENTINE # of risks 1/8 = low risk  - Post-op delirium risk: low risk  - Risk of PONV score = 1.  If > 2, anti-emetic intervention recommended.      No previous anesthesia.  1) Cardiac: No known cardiac diagnosis or symptoms. EKG 5/26/2017 NSR.  2) Pulmonary: Never smoked, denies pulmonary symptoms.  3) Renal: BPH with symptoms of frequency. Elevated PSA in 2014, but biopsy negative.   METS >4, feasible airway    Pt optimized for surgery. AVS with information on surgery time/arrival time, meds and NPO status given by nursing staff      Patient was discussed with Dr Paredes.    MAISHA Chavira CNS  Preoperative Assessment Center  [facility]  Clinic and Surgery Center  Phone: [phone]  Fax: [phone]

## 2017-09-07 NOTE — ANESTHESIA PREPROCEDURE EVALUATION
Anesthesia Evaluation     . Pt has not had prior anesthetic            ROS/MED HX    ENT/Pulmonary:  - neg pulmonary ROS     Neurologic:  - neg neurologic ROS     Cardiovascular:  - neg cardiovascular ROS       METS/Exercise Tolerance:  >4 METS   Hematologic:  - neg hematologic  ROS       Musculoskeletal:  - neg musculoskeletal ROS       GI/Hepatic:  - neg GI/hepatic ROS       Renal/Genitourinary:     (+) BPH,       Endo:  - neg endo ROS       Psychiatric:  - neg psychiatric ROS       Infectious Disease:  - neg infectious disease ROS       Malignancy:      - no malignancy   Other:    (+) No chance of pregnancy C-spine cleared: N/A, no H/O Chronic Pain,no other significant disability   - neg other ROS                 Physical Exam  Normal systems: cardiovascular, pulmonary and dental    Airway   Mallampati: II  TM distance: >3 FB  Neck ROM: full    Dental     Cardiovascular   Rhythm and rate: regular and normal      Pulmonary    breath sounds clear to auscultation    Other findings:   Lab Results      Component                Value               Date                      WBC                      5.0                 09/07/2017            Lab Results      Component                Value               Date                      RBC                      5.21                09/07/2017            Lab Results      Component                Value               Date                      HGB                      14.0                09/07/2017            Lab Results      Component                Value               Date                      HCT                      41.8                09/07/2017            No components found for: MCT  Lab Results      Component                Value               Date                      MCV                      80                  09/07/2017            Lab Results      Component                Value               Date                      MCH                      26.9                09/07/2017             Lab Results      Component                Value               Date                      MCHC                     33.5                09/07/2017            Lab Results      Component                Value               Date                      RDW                      12.8                09/07/2017            Lab Results      Component                Value               Date                      PLT                      226                 09/07/2017              Last Basic Metabolic Panel:  Lab Results      Component                Value               Date                      NA                       139                 09/07/2017             Lab Results      Component                Value               Date                      POTASSIUM                3.9                 09/07/2017            Lab Results      Component                Value               Date                      CHLORIDE                 105                 09/07/2017            Lab Results      Component                Value               Date                      KELI                      8.6                 09/07/2017            Lab Results      Component                Value               Date                      CO2                      28                  09/07/2017            Lab Results      Component                Value               Date                      BUN                      18                  09/07/2017            Lab Results      Component                Value               Date                      CR                       0.96                09/07/2017            Lab Results      Component                Value               Date                      GLC                      106                 09/07/2017              UA RESULTS:  Lab Test        09/07/17                       1609          COLOR        Yellow        APPEARANCE   Clear         URINEGLC     Negative      URINEBILI    Negative      URINEKETONE   Negative      SG           1.018         UBLD         Negative      URINEPH      5.0           PROTEIN      Negative      NITRITE      Negative      LEUKEST      Negative               PAC Discussion and Assessment    ASA Classification: 2  Case is suitable for: [place]  Anesthetic techniques and relevant risks discussed: GA  Invasive monitoring and risk discussed: Yes  Types:   Possibility and Risk of blood transfusion discussed: Yes  NPO instructions given:   Additional anesthetic preparation and risks discussed:   Needs early admission to pre-op area:   Other:     PAC Resident/NP Anesthesia Assessment:  Chaitanya Ovalles is a 50 yo male scheduled for Cystoscopy, Transurethral Resection of the Prostate on 9/18/2017 by Dr. Mena. PAC referral by Dr. Mena for assessment and optimization of anesthesia. No previous anesthesia.    1) Cardiac: No known cardiac diagnosis or symptoms. EKG 5/26/2017 NSR.  2) Pulmonary: Never smoked, denies pulmonary symptoms.  3) Renal: BPH with symptoms of frequency. Elevated PSA in 2014, but biopsy negative.     METS >4, feasible airway      Pt also evaluated by Dr. Paredes. Please see recommendations below. Labs drawn today.  I spent 20 minutes face to face with pt, assessing, examining, and educating        Reviewed and Signed by PAC Mid-Level Provider/Resident  Mid-Level Provider/Resident: Cherry Felder, MAISHA  Date: 9/7/2017  Time: 1430    Attending Anesthesiologist Anesthesia Assessment:  51 year old ASA 2 for cysto, TURP in management of enlarged prostate. Chart reviewed, patient seen and evaluated; agree with above assessment.    Patient is appropriate for the planned procedure without further workup or medical management change. The final anesthesia plan will be determined by the physician anesthesiologist caring for the patient on the day of surgery.      Reviewed and Signed by PAC Anesthesiologist  Anesthesiologist: lisa  Date: 9/7/2017  Time:   Pass/Fail: Pass  Disposition:      PAC Pharmacist Assessment:        Pharmacist:   Date:   Time:      Anesthesia Plan      History & Physical Review  History and physical reviewed and following examination; no interval change.    ASA Status:  2 .    NPO Status:  > 8 hours    Plan for General and ETT with Intravenous induction. Maintenance will be Inhalation.    PONV prophylaxis:  Ondansetron (or other 5HT-3) and Dexamethasone or Solumedrol       Postoperative Care  Postoperative pain management:  Multi-modal analgesia.      Consents  Anesthetic plan, risks, benefits and alternatives discussed with:  Patient..                          .

## 2017-09-18 ENCOUNTER — HOSPITAL ENCOUNTER (OUTPATIENT)
Facility: CLINIC | Age: 52
Discharge: HOME | End: 2017-09-19
Attending: UROLOGY | Admitting: UROLOGY
Payer: COMMERCIAL

## 2017-09-18 ENCOUNTER — ANESTHESIA (OUTPATIENT)
Dept: SURGERY | Facility: CLINIC | Age: 52
End: 2017-09-18
Payer: COMMERCIAL

## 2017-09-18 ENCOUNTER — SURGERY (OUTPATIENT)
Age: 52
End: 2017-09-18

## 2017-09-18 DIAGNOSIS — N40.0 ENLARGED PROSTATE: Primary | ICD-10-CM

## 2017-09-18 LAB
ABO + RH BLD: NORMAL
ABO + RH BLD: NORMAL
ANION GAP SERPL CALCULATED.3IONS-SCNC: 5 MMOL/L (ref 3–14)
BLD GP AB SCN SERPL QL: NORMAL
BLOOD BANK CMNT PATIENT-IMP: NORMAL
BLOOD BANK CMNT PATIENT-IMP: NORMAL
BUN SERPL-MCNC: 13 MG/DL (ref 7–30)
CALCIUM SERPL-MCNC: 8.2 MG/DL (ref 8.5–10.1)
CHLORIDE SERPL-SCNC: 107 MMOL/L (ref 94–109)
CO2 SERPL-SCNC: 26 MMOL/L (ref 20–32)
CREAT SERPL-MCNC: 0.85 MG/DL (ref 0.66–1.25)
ERYTHROCYTE [DISTWIDTH] IN BLOOD BY AUTOMATED COUNT: 13.2 % (ref 10–15)
GFR SERPL CREATININE-BSD FRML MDRD: >90 ML/MIN/1.7M2
GLUCOSE SERPL-MCNC: 96 MG/DL (ref 70–99)
HCT VFR BLD AUTO: 39.5 % (ref 40–53)
HGB BLD-MCNC: 13 G/DL (ref 13.3–17.7)
MCH RBC QN AUTO: 26.4 PG (ref 26.5–33)
MCHC RBC AUTO-ENTMCNC: 32.9 G/DL (ref 31.5–36.5)
MCV RBC AUTO: 80 FL (ref 78–100)
PLATELET # BLD AUTO: 161 10E9/L (ref 150–450)
POTASSIUM SERPL-SCNC: 3.5 MMOL/L (ref 3.4–5.3)
RBC # BLD AUTO: 4.92 10E12/L (ref 4.4–5.9)
SODIUM SERPL-SCNC: 138 MMOL/L (ref 133–144)
SPECIMEN EXP DATE BLD: NORMAL
WBC # BLD AUTO: 4.9 10E9/L (ref 4–11)

## 2017-09-18 PROCEDURE — 37000009 ZZH ANESTHESIA TECHNICAL FEE, EACH ADDTL 15 MIN: Performed by: UROLOGY

## 2017-09-18 PROCEDURE — 40000170 ZZH STATISTIC PRE-PROCEDURE ASSESSMENT II: Performed by: UROLOGY

## 2017-09-18 PROCEDURE — 25000125 ZZHC RX 250: Performed by: NURSE ANESTHETIST, CERTIFIED REGISTERED

## 2017-09-18 PROCEDURE — 80048 BASIC METABOLIC PNL TOTAL CA: CPT | Performed by: STUDENT IN AN ORGANIZED HEALTH CARE EDUCATION/TRAINING PROGRAM

## 2017-09-18 PROCEDURE — 88305 TISSUE EXAM BY PATHOLOGIST: CPT | Performed by: UROLOGY

## 2017-09-18 PROCEDURE — 25000565 ZZH ISOFLURANE, EA 15 MIN: Performed by: UROLOGY

## 2017-09-18 PROCEDURE — C1769 GUIDE WIRE: HCPCS | Performed by: UROLOGY

## 2017-09-18 PROCEDURE — 25000128 H RX IP 250 OP 636: Performed by: ANESTHESIOLOGY

## 2017-09-18 PROCEDURE — 36000059 ZZH SURGERY LEVEL 3 EA 15 ADDTL MIN UMMC: Performed by: UROLOGY

## 2017-09-18 PROCEDURE — C9399 UNCLASSIFIED DRUGS OR BIOLOG: HCPCS | Performed by: NURSE ANESTHETIST, CERTIFIED REGISTERED

## 2017-09-18 PROCEDURE — 85027 COMPLETE CBC AUTOMATED: CPT | Performed by: STUDENT IN AN ORGANIZED HEALTH CARE EDUCATION/TRAINING PROGRAM

## 2017-09-18 PROCEDURE — 25000128 H RX IP 250 OP 636: Performed by: NURSE ANESTHETIST, CERTIFIED REGISTERED

## 2017-09-18 PROCEDURE — 27210794 ZZH OR GENERAL SUPPLY STERILE: Performed by: UROLOGY

## 2017-09-18 PROCEDURE — 71000015 ZZH RECOVERY PHASE 1 LEVEL 2 EA ADDTL HR: Performed by: UROLOGY

## 2017-09-18 PROCEDURE — 25000128 H RX IP 250 OP 636: Performed by: UROLOGY

## 2017-09-18 PROCEDURE — 37000008 ZZH ANESTHESIA TECHNICAL FEE, 1ST 30 MIN: Performed by: UROLOGY

## 2017-09-18 PROCEDURE — 36415 COLL VENOUS BLD VENIPUNCTURE: CPT | Performed by: STUDENT IN AN ORGANIZED HEALTH CARE EDUCATION/TRAINING PROGRAM

## 2017-09-18 PROCEDURE — 36000057 ZZH SURGERY LEVEL 3 1ST 30 MIN - UMMC: Performed by: UROLOGY

## 2017-09-18 PROCEDURE — 71000014 ZZH RECOVERY PHASE 1 LEVEL 2 FIRST HR: Performed by: UROLOGY

## 2017-09-18 RX ORDER — ONDANSETRON 2 MG/ML
INJECTION INTRAMUSCULAR; INTRAVENOUS PRN
Status: DISCONTINUED | OUTPATIENT
Start: 2017-09-18 | End: 2017-09-18

## 2017-09-18 RX ORDER — PROPOFOL 10 MG/ML
INJECTION, EMULSION INTRAVENOUS PRN
Status: DISCONTINUED | OUTPATIENT
Start: 2017-09-18 | End: 2017-09-18

## 2017-09-18 RX ORDER — FENTANYL CITRATE 50 UG/ML
25-50 INJECTION, SOLUTION INTRAMUSCULAR; INTRAVENOUS
Status: DISCONTINUED | OUTPATIENT
Start: 2017-09-18 | End: 2017-09-19 | Stop reason: HOSPADM

## 2017-09-18 RX ORDER — LIDOCAINE 40 MG/G
CREAM TOPICAL
Status: DISCONTINUED | OUTPATIENT
Start: 2017-09-18 | End: 2017-09-18 | Stop reason: HOSPADM

## 2017-09-18 RX ORDER — SODIUM CHLORIDE, SODIUM LACTATE, POTASSIUM CHLORIDE, CALCIUM CHLORIDE 600; 310; 30; 20 MG/100ML; MG/100ML; MG/100ML; MG/100ML
INJECTION, SOLUTION INTRAVENOUS CONTINUOUS
Status: DISCONTINUED | OUTPATIENT
Start: 2017-09-18 | End: 2017-09-19 | Stop reason: HOSPADM

## 2017-09-18 RX ORDER — ONDANSETRON 2 MG/ML
4 INJECTION INTRAMUSCULAR; INTRAVENOUS EVERY 30 MIN PRN
Status: DISCONTINUED | OUTPATIENT
Start: 2017-09-18 | End: 2017-09-19 | Stop reason: HOSPADM

## 2017-09-18 RX ORDER — LIDOCAINE HYDROCHLORIDE 20 MG/ML
INJECTION, SOLUTION INFILTRATION; PERINEURAL PRN
Status: DISCONTINUED | OUTPATIENT
Start: 2017-09-18 | End: 2017-09-18

## 2017-09-18 RX ORDER — HYDROMORPHONE HYDROCHLORIDE 1 MG/ML
.3-.5 INJECTION, SOLUTION INTRAMUSCULAR; INTRAVENOUS; SUBCUTANEOUS EVERY 5 MIN PRN
Status: DISCONTINUED | OUTPATIENT
Start: 2017-09-18 | End: 2017-09-19 | Stop reason: HOSPADM

## 2017-09-18 RX ORDER — CEFAZOLIN SODIUM 2 G/100ML
2 INJECTION, SOLUTION INTRAVENOUS
Status: COMPLETED | OUTPATIENT
Start: 2017-09-18 | End: 2017-09-18

## 2017-09-18 RX ORDER — SODIUM CHLORIDE, SODIUM LACTATE, POTASSIUM CHLORIDE, CALCIUM CHLORIDE 600; 310; 30; 20 MG/100ML; MG/100ML; MG/100ML; MG/100ML
INJECTION, SOLUTION INTRAVENOUS CONTINUOUS
Status: DISCONTINUED | OUTPATIENT
Start: 2017-09-18 | End: 2017-09-18 | Stop reason: HOSPADM

## 2017-09-18 RX ORDER — CEFAZOLIN SODIUM 1 G/3ML
1 INJECTION, POWDER, FOR SOLUTION INTRAMUSCULAR; INTRAVENOUS SEE ADMIN INSTRUCTIONS
Status: DISCONTINUED | OUTPATIENT
Start: 2017-09-18 | End: 2017-09-18 | Stop reason: HOSPADM

## 2017-09-18 RX ORDER — ONDANSETRON 4 MG/1
4 TABLET, ORALLY DISINTEGRATING ORAL EVERY 30 MIN PRN
Status: DISCONTINUED | OUTPATIENT
Start: 2017-09-18 | End: 2017-09-19 | Stop reason: HOSPADM

## 2017-09-18 RX ORDER — FENTANYL CITRATE 50 UG/ML
INJECTION, SOLUTION INTRAMUSCULAR; INTRAVENOUS PRN
Status: DISCONTINUED | OUTPATIENT
Start: 2017-09-18 | End: 2017-09-18

## 2017-09-18 RX ADMIN — HYDROMORPHONE HYDROCHLORIDE 0.5 MG: 1 INJECTION, SOLUTION INTRAMUSCULAR; INTRAVENOUS; SUBCUTANEOUS at 20:09

## 2017-09-18 RX ADMIN — ONDANSETRON 4 MG: 2 INJECTION INTRAMUSCULAR; INTRAVENOUS at 19:22

## 2017-09-18 RX ADMIN — HYDROMORPHONE HYDROCHLORIDE 0.5 MG: 1 INJECTION, SOLUTION INTRAMUSCULAR; INTRAVENOUS; SUBCUTANEOUS at 20:30

## 2017-09-18 RX ADMIN — SODIUM CHLORIDE, POTASSIUM CHLORIDE, SODIUM LACTATE AND CALCIUM CHLORIDE: 600; 310; 30; 20 INJECTION, SOLUTION INTRAVENOUS at 18:46

## 2017-09-18 RX ADMIN — MIDAZOLAM HYDROCHLORIDE 2 MG: 1 INJECTION, SOLUTION INTRAMUSCULAR; INTRAVENOUS at 18:46

## 2017-09-18 RX ADMIN — ROCURONIUM BROMIDE 10 MG: 10 INJECTION INTRAVENOUS at 20:00

## 2017-09-18 RX ADMIN — FENTANYL CITRATE 50 MCG: 50 INJECTION, SOLUTION INTRAMUSCULAR; INTRAVENOUS at 21:15

## 2017-09-18 RX ADMIN — SUGAMMADEX 200 MG: 100 INJECTION, SOLUTION INTRAVENOUS at 21:23

## 2017-09-18 RX ADMIN — ROCURONIUM BROMIDE 20 MG: 10 INJECTION INTRAVENOUS at 19:22

## 2017-09-18 RX ADMIN — SODIUM CHLORIDE, POTASSIUM CHLORIDE, SODIUM LACTATE AND CALCIUM CHLORIDE: 600; 310; 30; 20 INJECTION, SOLUTION INTRAVENOUS at 21:40

## 2017-09-18 RX ADMIN — CEFAZOLIN 1 G: 1 INJECTION, POWDER, FOR SOLUTION INTRAMUSCULAR; INTRAVENOUS at 21:12

## 2017-09-18 RX ADMIN — ROCURONIUM BROMIDE 10 MG: 10 INJECTION INTRAVENOUS at 20:30

## 2017-09-18 RX ADMIN — PROPOFOL 150 MG: 10 INJECTION, EMULSION INTRAVENOUS at 19:00

## 2017-09-18 RX ADMIN — CEFAZOLIN SODIUM 2 G: 2 INJECTION, SOLUTION INTRAVENOUS at 19:15

## 2017-09-18 RX ADMIN — FENTANYL CITRATE 100 MCG: 50 INJECTION, SOLUTION INTRAMUSCULAR; INTRAVENOUS at 18:52

## 2017-09-18 RX ADMIN — ROCURONIUM BROMIDE 50 MG: 10 INJECTION INTRAVENOUS at 19:00

## 2017-09-18 RX ADMIN — LIDOCAINE HYDROCHLORIDE 100 MG: 20 INJECTION, SOLUTION INFILTRATION; PERINEURAL at 19:00

## 2017-09-18 RX ADMIN — FENTANYL CITRATE 100 MCG: 50 INJECTION, SOLUTION INTRAMUSCULAR; INTRAVENOUS at 19:05

## 2017-09-18 RX ADMIN — HYDROMORPHONE HYDROCHLORIDE 0.5 MG: 1 INJECTION, SOLUTION INTRAMUSCULAR; INTRAVENOUS; SUBCUTANEOUS at 22:38

## 2017-09-18 ASSESSMENT — PAIN DESCRIPTION - DESCRIPTORS: DESCRIPTORS: SHARP

## 2017-09-18 NOTE — IP AVS SNAPSHOT
MRN:[id number]                      After Visit Summary   9/18/2017    Chaitanya Ovalles    MRN: [id number]           Thank you!     Thank you for choosing [facility] for your care. Our goal is always to provide you with excellent care. Hearing back from our patients is one way we can continue to improve our services. Please take a few minutes to complete the written survey that you may receive in the mail after you visit with us. Thank you!        Patient Information     Date Of Birth          1965        About your hospital stay     You were admitted on:  September 18, 2017 You last received care in the:  Unit 6D Observation [facility]    You were discharged on:  September 19, 2017       Who to Call     For medical emergencies, please call 911.  For non-urgent questions about your medical care, please call your primary care provider or clinic, [phone]  For questions related to your surgery, please call your surgery clinic        Attending Provider     Provider Nadine Campoverde MD Urology       Primary Care Provider Office Phone # Fax #    Larry Mcdaniel [phone]      After Care Instructions     Diet       Follow this diet upon discharge: Orders Placed This Encounter      Advance Diet as Tolerated: Regular Diet Adult            Discharge Instructions       Activity  - No strenuous exercise for 6 weeks.  - No lifting, pushing, pulling more than 10 pounds for 6 weeks.   - Do not strain with bowel movements.  - Do not drive until you can press the brake pedal quickly and fully without pain.   - Do not operate a motor vehicle while taking narcotic pain medications.     Urination  Catheter removed prior to discharge  - Some light redness (up to a watermelon-like-pink in color) is expected in the upcoming 7-10days.   - If having hematuria (blood in the urine), make sure to increase your water intake and monitor for improvement  - If you are  "unable to urinate you should return urgently to the ED or call clinic to try to arrange for an urgent visit same day.       Medications  - Transition from narcotic pain medications to tylenol (acetaminophen) as you are able.  Wean yourself off all pain medications as you are able.  - Some pain medications contain both tylenol (acetaminophen) and a narcotic (Norco, vicodin, percocet), do not take more than 4,000mg of Tylenol (acetaminophen) from all sources in any 24 hour period.  - Narcotics can make you constipated.  Take over the counter fiber (metamucil or benefiber) and stool softeners (miralax, docusate or senna) while taking narcotic pain medications, but stop if you develop diarrhea.  - No driving or operating machinery while taking narcotic pain medications     Follow-Up:  - Call your primary care provider to touch base regarding your recent admission.   - Follow up with Dr. Mena as previously scheduled    - Call or return sooner than your regularly scheduled visit if you develop any of the following: fever (greater than 101.5), uncontrolled pain, uncontrolled nausea or vomiting, as well as increased redness, swelling, or drainage from your wound.     Phone numbers:   - Monday through Friday 8am to 4:30pm: Call [phone] with questions or to schedule or confirm appointment.    - Nights or weekends: call the after hours emergency pager - [phone] and tell the  \"I would like to page the Urology Resident on call.\"  - For emergencies, call 911                  Your next 10 appointments already scheduled     Oct 13, 2017  2:15 PM CDT   (Arrive by 2:00 PM)   Post-Op with Nadine Mena MD   [facility] for Prostate and Urologic Cancers ([facility])    [address]  4th Floor  [place] [address] [phone]              Pending Results     Date and Time Order Name Status Description    9/18/2017 2115 Surgical pathology exam In " "process             Statement of Approval     Ordered          09/19/17 1308  I have reviewed and agree with all the recommendations and orders detailed in this document.  EFFECTIVE NOW     Approved and electronically signed by:  Mikki Ramirez MD             Admission Information     Date & Time Provider Department Dept. Phone    9/18/2017 Nadine Mena MD Unit 6D Observation [facility] [phone]      Your Vitals Were     Blood Pressure Temperature Respirations Height Weight Pulse Oximetry    122/78 99.2  F (37.3  C) (Oral) 16 1.727 m (5' 8\") 84.4 kg (186 lb 1.1 oz) 98%    BMI (Body Mass Index)                   28.29 kg/m2           MyChart Information     [employer] gives you secure access to your electronic health record. If you see a primary care provider, you can also send messages to your care team and make appointments. If you have questions, please call your primary care clinic.  If you do not have a primary care provider, please call [phone] and they will assist you.        Care EveryWhere ID     This is your Care EveryWhere ID. This could be used by other organizations to access your [place] medical records  [id number]        Equal Access to Services     MICHAEL LEE AH: Hadii arpan Bautista, wakellyda mat, qaybta kaalmada cindy, lasha faria. So [facility] [phone].    ATENCIÓN: Si habla español, tiene a pimentel disposición servicios gratuitos de asistencia lingüística. Llame al [phone].    We comply with applicable federal civil rights laws and [place] laws. We do not discriminate on the basis of race, color, national origin, age, disability sex, sexual orientation or gender identity.               Review of your medicines      START taking        Dose / Directions    oxyCODONE 5 MG IR tablet   Commonly known as:  ROXICODONE        Dose:  5-10 mg   Take 1-2 tablets (5-10 mg) by mouth every 3 hours as needed for moderate to severe " pain   Quantity:  15 tablet   Refills:  0       phenazopyridine 100 MG tablet   Commonly known as:  PYRIDIUM        Dose:  100 mg   Take 1 tablet (100 mg) by mouth 3 times daily (with meals)   Quantity:  12 tablet   Refills:  0            Where to get your medicines      These medications were sent to [facility] [place], [place] - [address]  [address] [address]     Phone:  [phone]     phenazopyridine 100 MG tablet         Some of these will need a paper prescription and others can be bought over the counter. Ask your nurse if you have questions.     Bring a paper prescription for each of these medications     oxyCODONE 5 MG IR tablet                Protect others around you: Learn how to safely use, store and throw away your medicines at www.disposemymeds.org.             Medication List: This is a list of all your medications and when to take them. Check marks below indicate your daily home schedule. Keep this list as a reference.      Medications           Morning Afternoon Evening Bedtime As Needed    oxyCODONE 5 MG IR tablet   Commonly known as:  ROXICODONE   Take 1-2 tablets (5-10 mg) by mouth every 3 hours as needed for moderate to severe pain   Last time this was given:  10 mg on 9/19/2017  7:02 AM                                phenazopyridine 100 MG tablet   Commonly known as:  PYRIDIUM   Take 1 tablet (100 mg) by mouth 3 times daily (with meals)   Last time this was given:  100 mg on 9/19/2017 12:58 PM

## 2017-09-18 NOTE — IP AVS SNAPSHOT
Unit 6D Observation [address] [address]    Phone:  [phone]    Fax:  [phone]                                       After Visit Summary   9/18/2017    Chaitanya Ovalles    MRN: [id number]           After Visit Summary Signature Page     I have received my discharge instructions, and my questions have been answered. I have discussed any challenges I see with this plan with the nurse or doctor.    ..........................................................................................................................................  Patient/Patient Representative Signature      ..........................................................................................................................................  Patient Representative Print Name and Relationship to Patient    ..................................................               ................................................  Date                                            Time    ..........................................................................................................................................  Reviewed by Signature/Title    ...................................................              ..............................................  Date                                                            Time

## 2017-09-19 VITALS
TEMPERATURE: 99.2 F | HEIGHT: 68 IN | BODY MASS INDEX: 28.2 KG/M2 | RESPIRATION RATE: 16 BRPM | DIASTOLIC BLOOD PRESSURE: 78 MMHG | SYSTOLIC BLOOD PRESSURE: 122 MMHG | OXYGEN SATURATION: 98 % | WEIGHT: 186.07 LBS

## 2017-09-19 LAB
ANION GAP SERPL CALCULATED.3IONS-SCNC: 7 MMOL/L (ref 3–14)
BUN SERPL-MCNC: 18 MG/DL (ref 7–30)
CALCIUM SERPL-MCNC: 8.2 MG/DL (ref 8.5–10.1)
CHLORIDE SERPL-SCNC: 105 MMOL/L (ref 94–109)
CO2 SERPL-SCNC: 25 MMOL/L (ref 20–32)
CREAT SERPL-MCNC: 0.97 MG/DL (ref 0.66–1.25)
ERYTHROCYTE [DISTWIDTH] IN BLOOD BY AUTOMATED COUNT: 13.3 % (ref 10–15)
GFR SERPL CREATININE-BSD FRML MDRD: 82 ML/MIN/1.7M2
GLUCOSE BLDC GLUCOMTR-MCNC: 111 MG/DL (ref 70–99)
GLUCOSE SERPL-MCNC: 102 MG/DL (ref 70–99)
HCT VFR BLD AUTO: 39 % (ref 40–53)
HGB BLD-MCNC: 12.7 G/DL (ref 13.3–17.7)
MCH RBC QN AUTO: 26.3 PG (ref 26.5–33)
MCHC RBC AUTO-ENTMCNC: 32.6 G/DL (ref 31.5–36.5)
MCV RBC AUTO: 81 FL (ref 78–100)
PLATELET # BLD AUTO: 171 10E9/L (ref 150–450)
POTASSIUM SERPL-SCNC: 4 MMOL/L (ref 3.4–5.3)
RBC # BLD AUTO: 4.82 10E12/L (ref 4.4–5.9)
SODIUM SERPL-SCNC: 138 MMOL/L (ref 133–144)
WBC # BLD AUTO: 6.9 10E9/L (ref 4–11)

## 2017-09-19 PROCEDURE — 25000132 ZZH RX MED GY IP 250 OP 250 PS 637: Performed by: STUDENT IN AN ORGANIZED HEALTH CARE EDUCATION/TRAINING PROGRAM

## 2017-09-19 PROCEDURE — 80048 BASIC METABOLIC PNL TOTAL CA: CPT | Performed by: STUDENT IN AN ORGANIZED HEALTH CARE EDUCATION/TRAINING PROGRAM

## 2017-09-19 PROCEDURE — 85027 COMPLETE CBC AUTOMATED: CPT | Performed by: STUDENT IN AN ORGANIZED HEALTH CARE EDUCATION/TRAINING PROGRAM

## 2017-09-19 PROCEDURE — 82962 GLUCOSE BLOOD TEST: CPT

## 2017-09-19 PROCEDURE — 36415 COLL VENOUS BLD VENIPUNCTURE: CPT | Performed by: STUDENT IN AN ORGANIZED HEALTH CARE EDUCATION/TRAINING PROGRAM

## 2017-09-19 RX ORDER — OXYCODONE HYDROCHLORIDE 5 MG/1
5-10 TABLET ORAL
Qty: 15 TABLET | Refills: 0 | Status: SHIPPED | OUTPATIENT
Start: 2017-09-19 | End: 2020-02-20

## 2017-09-19 RX ORDER — PHENAZOPYRIDINE HYDROCHLORIDE 100 MG/1
100 TABLET, FILM COATED ORAL
Status: DISCONTINUED | OUTPATIENT
Start: 2017-09-19 | End: 2017-09-19 | Stop reason: HOSPADM

## 2017-09-19 RX ORDER — NALOXONE HYDROCHLORIDE 0.4 MG/ML
.1-.4 INJECTION, SOLUTION INTRAMUSCULAR; INTRAVENOUS; SUBCUTANEOUS
Status: DISCONTINUED | OUTPATIENT
Start: 2017-09-19 | End: 2017-09-19 | Stop reason: HOSPADM

## 2017-09-19 RX ORDER — OXYBUTYNIN CHLORIDE 5 MG/1
5 TABLET ORAL 3 TIMES DAILY
Status: DISCONTINUED | OUTPATIENT
Start: 2017-09-19 | End: 2017-09-19 | Stop reason: HOSPADM

## 2017-09-19 RX ORDER — OXYCODONE HYDROCHLORIDE 5 MG/1
5-10 TABLET ORAL
Status: DISCONTINUED | OUTPATIENT
Start: 2017-09-19 | End: 2017-09-19 | Stop reason: HOSPADM

## 2017-09-19 RX ORDER — PHENAZOPYRIDINE HYDROCHLORIDE 100 MG/1
100 TABLET, FILM COATED ORAL
Qty: 12 TABLET | Refills: 0 | Status: SHIPPED | OUTPATIENT
Start: 2017-09-19 | End: 2020-02-20

## 2017-09-19 RX ADMIN — OXYBUTYNIN CHLORIDE 5 MG: 5 TABLET ORAL at 08:08

## 2017-09-19 RX ADMIN — PHENAZOPYRIDINE HYDROCHLORIDE 100 MG: 100 TABLET, COATED ORAL at 08:08

## 2017-09-19 RX ADMIN — OXYCODONE HYDROCHLORIDE 10 MG: 5 TABLET ORAL at 07:02

## 2017-09-19 RX ADMIN — PHENAZOPYRIDINE HYDROCHLORIDE 100 MG: 100 TABLET, COATED ORAL at 12:58

## 2017-09-19 ASSESSMENT — PAIN DESCRIPTION - DESCRIPTORS: DESCRIPTORS: TINGLING

## 2017-09-19 NOTE — PROGRESS NOTES
"Patient S/P Cystoscopy, Transurethral Resection of the Prostate. Patient on CBI with pink output noted, see I&O. Denied pain and nausea. Tolerated regular diet. Able to make needs known. Blood pressure 125/78, temperature 97.8  F (36.6  C), temperature source Oral, resp. rate 16, height 1.727 m (5' 8\"), weight 84.4 kg (186 lb 1.1 oz), SpO2 100 %.      "

## 2017-09-19 NOTE — PROGRESS NOTES
Discharge instruction reviewed.  Patient verbalized understanding. PIV removed, patient ambulated to the main lobby accompanied by family. Patient discharged

## 2017-09-19 NOTE — PROGRESS NOTES
"Urology Daily Progress Note    24 hour events/Subjective:     - No acute events overnight   - Pain well controlled on current regimen   - Tolerating regular diet ; no nausea or vomiting   - Passing flatus, no bowel movement   - Not yet ambulating.   - CBI     O:  Vitals: /78 (BP Location: Left arm)  Temp 97.8  F (36.6  C) (Oral)  Resp 16  Ht 1.727 m (5' 8\")  Wt 84.4 kg (186 lb 1.1 oz)  SpO2 100%  BMI 28.29 kg/m2  General: Alert, interactive, in NAD  Resp: Non-labored breathing on RA  Abdomen: Soft, non-tender, non distended.   Ext: Warm and well perfused   CBI: Light pink/watermelon    I/O last 3 completed shifts:  In: 1110 [P.O.:180; I.V.:930]  Out: 7450 [Urine:7450] - Last 24 hours    Labs/Imaging  Heme:  Recent Labs  Lab 09/18/17  2223   WBC 4.9   HGB 13.0*        Chem:  Recent Labs  Lab 09/18/17  2223   POTASSIUM 3.5   CR 0.85       Assessment/Plan  51 year old y/o male POD#0  s/p TURP for BPH. Post-op course complicated by hematuria, admitted for CBI. Currently doing well.     NEURO Pain well controlled on current regimen. Changes:  None    CV HDS.    PULM Aggressive pulmonary toilet and I/S.   FEN/GI SLIV. Continue current diet     Clamp CBI today, if clear will perform TOV with plan to remove clancy prior to discharge     HEME Hgb as above, stable. Continue to monitor. No transfusions indicated at this time   ID Afebrile, no leukocytosis.    Antibiotics: Completed periop antibiotics    ENDO No issues   ACTIVITY Up as tolerated  Ambulate at least TID    PPx SCDs, ambulation   DISPO 7B, likely today once meeting criteria         Seen and examined with chief resident, to be discussed with Dr. Contreras    --    Ace Preciado MD  Urology Resident           Contacting the Urology Team     Please use the following job codes to reach the Urology Team. Note that you must use an in house phone and that job codes cannot receive text pages.     On weekdays, dial [phone] (or star-star-star 777 on the " [employer] [place] telephones) then 0816 to reach the Adult Urology resident or PA on call    On weekdays, dial [phone] (or star-star-star 777 on the new [place] telephones) then 0818 to reach the Pediatric Urology resident    On weeknights and weekends, dial [phone] (or star-star-star 777 on the new [place] telephones) then [phone] to reach the Urology resident on call (for both Adult and Pediatrics)

## 2017-09-19 NOTE — PROVIDER NOTIFICATION
Trial void completed with 300 cc of sterile saline. Clancy d/c. Pt voided 250 cc immediately after after clancy pulled. PVR 65 cc.Will continue to monitor  MD notified at #[id number]

## 2017-09-19 NOTE — PROGRESS NOTES
"Pt is s/p TURT, Cystoscopy. Patirnt continues on CBI, output pink, see flow sheet. Ambulating in room independently, reports tingling and discomfort at tip of penis improved after taking oxycodone. Tolerating reg diet, no nausea./78  Temp 99.2  F (37.3  C) (Oral)  Resp 16  Ht 1.727 m (5' 8\")  Wt 84.4 kg (186 lb 1.1 oz)  SpO2 98%  BMI 28.29 kg/m2    "

## 2017-09-19 NOTE — ANESTHESIA POSTPROCEDURE EVALUATION
Patient: Chaitanya Ovalles    Procedure(s):  Cystoscopy, Transurethral Resection of the Prostate - Wound Class: II-Clean Contaminated    Diagnosis:Urinary Frequency  Diagnosis Additional Information: No value filed.    Anesthesia Type:  General, ETT    Note:  Anesthesia Post Evaluation    Patient location during evaluation: PACU  Patient participation: Able to fully participate in evaluation  Level of consciousness: awake and alert  Pain management: adequate  Airway patency: patent  Cardiovascular status: hemodynamically stable  Respiratory status: acceptable  Hydration status: acceptable  PONV: controlled             Last vitals:  Vitals:    09/18/17 2145 09/18/17 2200 09/18/17 2215   BP: (!) 138/92 139/89 138/80   Resp: 16 16 16   Temp:   36.4  C (97.5  F)   SpO2: 100% 100% 100%         Electronically Signed By: Reji Shen MD  September 18, 2017  10:47 PM

## 2017-09-19 NOTE — OP NOTE
OPERATIVE REPORT    PREOPERATIVE DIAGNOSIS:  Prostatic hypertrophy with lower urinary tract symptoms  urinary retention.   POSTOPERATIVE DIAGNOSIS: Same as above    PROCEDURE PERFORMED: Transurethral resection of prostate.   ATTENDING SURGEON: Nadine Contreras MD   RESIDENT SURGEON: Nadine Guzman MD    FINDINGS: Approximately 4.5 cm prostate with significant trilobar hypertrophy with large median lobe intravesical projection   ANESTHESIA: General.   INTRAVENOUS FLUIDS: See anesthesia records  ESTIMATED BLOOD LOSS: Less than 50 ml   SPECIMENS: Prostate chips.   DRAINS: 24-Mauritian 3-way catheter.     INDICATIONS FOR PROCEDURE: Chaitanya Ovalles is a(n) 51 year old male who was seen in consultation for  obstructive voiding symptoms. He had failed optimal medical therapy with Tamsulosin. Prostate volume on imaging was 72.9 grams.   His digital rectal exam was negative for any nodules.  His PSA was 8.34 and patient underwent prostatic biopsy which was negative for malignancy.  After discussion of the risks, benefits and alternatives of the procedure, the patient agreed to proceed with transurethral resection of his prostate.     DESCRIPTION OF PROCEDURE: After verifying informed consent, the patient was taken to the operating room. Adequate anesthesia was induced and he was placed in dorsal lithotomy position; he was prepped and draped in standard sterile fashion. A timeout was performed to verify correct patient and procedure. Pneumo boots and perioperative antibiotics were in place before the procedure commenced. We began the procedure by inserting a 22-Mauritian rigid cystoscope. The anterior urethra was unremarkable. Prostate measured approximately 4.5 cm. There was trilobar hypertrophy with significant median ramp. Upon gaining entrance into the bladder, complete survey was performed. This was significant for moderate trabeculation with scattered cellules.  We were able to see both ureteral orifices. There was a significant  median lobe.     We then introduced the 26-Burundian bipolar Gyrus resectoscope. We started by resecting the median lobe. We then resected tissue from the right and left lobe out to the capsule, from the neck of the bladder down to the verumontanum from the 6 o'clock position to 12 o'clock position. Once we had completed our dissection, we used the Prepair evacuator to remove the prostate chips. We then reintroduced the resectoscope to ensure meticulous hemostasis of the prostatic fossa.     A 22-Burundian 3-way catheter was placed, and 50 mL was instilled into the balloon. Catheter was hooked up to continuous irrigation which was clear;  the patient was awoken from anesthesia. He was then transferred to the recovery room in stable condition.     POSTOPERATIVE PLAN:   - Observe him overnight on continuous bladder irrigation.

## 2017-09-19 NOTE — ANESTHESIA CARE TRANSFER NOTE
Patient: Chaitanya Ovalles    Procedure(s):  Cystoscopy, Transurethral Resection of the Prostate - Wound Class: II-Clean Contaminated    Diagnosis: Urinary Frequency  Diagnosis Additional Information: No value filed.    Anesthesia Type:   General, ETT     Note:  Airway :Face Mask  Patient transferred to:PACU  Comments: Pt extubated in the OR without incident or complications. Pt VSS upon arrival to the PACU. Pt has no c/o pain/N/V. Pt care report given to receiving RN.       Vitals: (Last set prior to Anesthesia Care Transfer)    CRNA VITALS  9/18/2017 2105 - 9/18/2017 2139 9/18/2017             Resp Rate (set): 10                Electronically Signed By: MAISHA Hernandes CRNA  September 18, 2017  9:39 PM

## 2017-09-19 NOTE — BRIEF OP NOTE
[facility] [place]    Brief Operative Note    Pre-operative diagnosis: Urinary Frequency  Post-operative diagnosis Urinary Frequency   Procedure: Procedure(s):  Cystoscopy, Transurethral Resection of the Prostate - Wound Class: II-Clean Contaminated  Surgeon: Surgeon(s) and Role:     * Nadine Mena MD - Primary     * Nadine Guzman MD - Resident - Assisting  Anesthesia: General   Estimated blood loss: Less than 50 ml  Drains: 24 Maltese clancy catheter 3- way catheter  Specimens:   ID Type Source Tests Collected by Time Destination   A : Prostate chips Tissue Prostate SURGICAL PATHOLOGY EXAM Nadine Mena MD 9/18/2017  9:15 PM      Findings:   None.  Complications: None.  Implants: None.    - Admit to overnight outpatient   - Continue CBI overnight     Nadine Guzman MD   Urology Resident

## 2017-09-20 LAB — COPATH REPORT: NORMAL

## 2017-10-12 ENCOUNTER — PRE VISIT (OUTPATIENT)
Dept: UROLOGY | Facility: CLINIC | Age: 52
End: 2017-10-12

## 2017-10-12 NOTE — TELEPHONE ENCOUNTER
Patient is coming in to see  for a post op for             Prostatic hypertrophy with lower urinary tract symptoms, no need for a call.

## 2017-10-13 ENCOUNTER — OFFICE VISIT (OUTPATIENT)
Dept: UROLOGY | Facility: CLINIC | Age: 52
End: 2017-10-13

## 2017-10-13 VITALS
DIASTOLIC BLOOD PRESSURE: 80 MMHG | HEIGHT: 68 IN | SYSTOLIC BLOOD PRESSURE: 126 MMHG | WEIGHT: 179 LBS | HEART RATE: 70 BPM | BODY MASS INDEX: 27.13 KG/M2

## 2017-10-13 DIAGNOSIS — N40.0 ENLARGED PROSTATE: Primary | ICD-10-CM

## 2017-10-13 DIAGNOSIS — R39.198 SLOW URINARY STREAM: ICD-10-CM

## 2017-10-13 ASSESSMENT — PAIN SCALES - GENERAL: PAINLEVEL: NO PAIN (0)

## 2017-10-13 NOTE — LETTER
10/13/2017       RE: Chaitanya vOalles  [address] [address]     Dear Colleague,    Thank you for referring your patient, Chaitanya Ovalles, to the [facility] UROLOGY AND INST FOR PROSTATE AND UROLOGIC CANCERS at [facility]. Please see a copy of my visit note below.    UROLOGIC DIAGNOSES:       CURRENT INTERVENTIONS:       HISTORY:     Patient presents for elevated PSA and lower urinary tract symptoms.  IN 2014, patient had elevated PSA (6.8) and had a biopsy that was negative.   He was followed by outside urologist. Had cystoscopy that did not show stricture or other significant findings. Had not yet had UDS.      Notes that he continues to have some lower urinary tract symptoms despite taking tamsulosin (8mg, per patient report).     Patient had cystoscopy that showed significant trilobar hypertrophy.   Patient s/p prostate biopsy for PIRADS 3 lesion and elevated PSA. No evidence of prostate cancer on the specimen.     Currently s/p TURP and states that his stream is excellent and that his symptoms have improved. He is states he is pleased with the outcome.           PAST MEDICAL HISTORY:   Past Medical History:   Diagnosis Date     BPH (benign prostatic hypertrophy) with urinary obstruction      Elevated PSA        PAST SURGICAL HISTORY:   Past Surgical History:   Procedure Laterality Date     CYSTOSCOPY, TRANSURETHRAL RESECTION (TUR) PROSTATE, COMBINED N/A 9/18/2017    Procedure: COMBINED CYSTOSCOPY, TRANSURETHRAL RESECTION (TUR) PROSTATE;  Cystoscopy, Transurethral Resection of the Prostate;  Surgeon: Nadine Mena MD;  Location: [facility] OR     NO HISTORY OF SURGERY         FAMILY HISTORY: No family history on file.    SOCIAL HISTORY:   Social History   Substance Use Topics     Smoking status: Never Smoker     Smokeless tobacco: Never Used     Alcohol use Not on file      Comment: social       Current Outpatient Prescriptions   Medication     oxyCODONE  "(ROXICODONE) 5 MG IR tablet     phenazopyridine (PYRIDIUM) 100 MG tablet     No current facility-administered medications for this visit.          PHYSICAL EXAM:    /80  Pulse 70  Ht 1.727 m (5' 8\")  Wt 81.2 kg (179 lb)  BMI 27.22 kg/m2    HEENT: Normocephalic and atraumatic   Cardiac: Not done  Back/Flank: Not done  CNS/PNS: Not done  Respiratory: Normal non-labored breathing  Abdomen: Soft nontender and nondistended  Peripheral Vascular: Not done  Mental Status: Not done    Penis: Not done  Scrotal Skin: Not done  Testicles: Not done  Epididymis: Not done  Digital Rectal Exam:     Cystoscopy: Not done    Imaging: None    Urinalysis: UA RESULTS:  No results for input(s): COLOR, APPEARANCE, URINEGLC, URINEBILI, URINEKETONE, SG, UBLD, URINEPH, PROTEIN, UROBILINOGEN, NITRITE, LEUKEST, RBCU, WBCU in the last 20134 hours.    PSA:     Post Void Residual:     Other labs: None today      IMPRESSION:  50 y/o M with elevated PSA, lower urinary tract symptoms despite medication, significant trilobar hypertrophy       PLAN:  Uroflow/PVR in six weeks   Follow up in six week     Total Time: 15 minutes                                     Total in Consultation: greater than 50%       Again, thank you for allowing me to participate in the care of your patient.      Sincerely,    Nadine Mena MD      "

## 2017-10-13 NOTE — PROGRESS NOTES
"UROLOGIC DIAGNOSES:       CURRENT INTERVENTIONS:       HISTORY:     Patient presents for elevated PSA and lower urinary tract symptoms.  IN 2014, patient had elevated PSA (6.8) and had a biopsy that was negative.   He was followed by outside urologist. Had cystoscopy that did not show stricture or other significant findings. Had not yet had UDS.      Notes that he continues to have some lower urinary tract symptoms despite taking tamsulosin (8mg, per patient report).     Patient had cystoscopy that showed significant trilobar hypertrophy.   Patient s/p prostate biopsy for PIRADS 3 lesion and elevated PSA. No evidence of prostate cancer on the specimen.     Currently s/p TURP and states that his stream is excellent and that his symptoms have improved. He is states he is pleased with the outcome.           PAST MEDICAL HISTORY:   Past Medical History:   Diagnosis Date     BPH (benign prostatic hypertrophy) with urinary obstruction      Elevated PSA        PAST SURGICAL HISTORY:   Past Surgical History:   Procedure Laterality Date     CYSTOSCOPY, TRANSURETHRAL RESECTION (TUR) PROSTATE, COMBINED N/A 9/18/2017    Procedure: COMBINED CYSTOSCOPY, TRANSURETHRAL RESECTION (TUR) PROSTATE;  Cystoscopy, Transurethral Resection of the Prostate;  Surgeon: Nadine Mena MD;  Location:  OR     NO HISTORY OF SURGERY         FAMILY HISTORY: No family history on file.    SOCIAL HISTORY:   Social History   Substance Use Topics     Smoking status: Never Smoker     Smokeless tobacco: Never Used     Alcohol use Not on file      Comment: social       Current Outpatient Prescriptions   Medication     oxyCODONE (ROXICODONE) 5 MG IR tablet     phenazopyridine (PYRIDIUM) 100 MG tablet     No current facility-administered medications for this visit.          PHYSICAL EXAM:    /80  Pulse 70  Ht 1.727 m (5' 8\")  Wt 81.2 kg (179 lb)  BMI 27.22 kg/m2    HEENT: Normocephalic and atraumatic   Cardiac: Not done  Back/Flank: Not " done  CNS/PNS: Not done  Respiratory: Normal non-labored breathing  Abdomen: Soft nontender and nondistended  Peripheral Vascular: Not done  Mental Status: Not done    Penis: Not done  Scrotal Skin: Not done  Testicles: Not done  Epididymis: Not done  Digital Rectal Exam:     Cystoscopy: Not done    Imaging: None    Urinalysis: UA RESULTS:  No results for input(s): COLOR, APPEARANCE, URINEGLC, URINEBILI, URINEKETONE, SG, UBLD, URINEPH, PROTEIN, UROBILINOGEN, NITRITE, LEUKEST, RBCU, WBCU in the last [id number] hours.    PSA:     Post Void Residual:     Other labs: None today      IMPRESSION:  50 y/o M with elevated PSA, lower urinary tract symptoms despite medication, significant trilobar hypertrophy       PLAN:  Uroflow/PVR in six weeks   Follow up in six week     Total Time: 15 minutes                                     Total in Consultation: greater than 50%

## 2017-10-13 NOTE — MR AVS SNAPSHOT
After Visit Summary   10/13/2017    Chaitanya Ovalles    MRN: [id number]           Patient Information     Date Of Birth          1965        Visit Information        Provider Department      10/13/2017 2:15 PM Nadine Mena MD [facility] and [facility]        Today's Diagnoses     Enlarged prostate    -  1    Slow urinary stream           Follow-ups after your visit        Your next 10 appointments already scheduled     Dec 16, 2017 10:30 AM CST   (Arrive by 10:15 AM)   Return Visit with Nadine Mena MD   [facility] and [facility] ([facility] [place])    [address]  [address] [facility] [address] [phone]              Who to contact     Please call your clinic at [phone] to:    Ask questions about your health    Make or cancel appointments    Discuss your medicines    Learn about your test results    Speak to your doctor   If you have compliments or concerns about an experience at your clinic, or if you wish to file a complaint, please contact [facility] Patient Relations at [phone] or email us at [email or web address]         Additional Information About Your Visit        MyChart Information     [employer] gives you secure access to your electronic health record. If you see a primary care provider, you can also send messages to your care team and make appointments. If you have questions, please call your primary care clinic.  If you do not have a primary care provider, please call [phone] and they will assist you.      [employer] is an electronic gateway that provides easy, online access to your medical records. With [employer]P, you can request a clinic appointment, read your test results, renew a prescription or communicate with your care team.     To access your existing account, please contact your [facility]  "Physicians Clinic or call [phone] for assistance.        Care EveryWhere ID     This is your Care EveryWhere ID. This could be used by other organizations to access your [facility] [place] medical records  [id number]        Your Vitals Were     Pulse Height BMI (Body Mass Index)             70 1.727 m (5' 8\") 27.22 kg/m2          Blood Pressure from Last 3 Encounters:   10/13/17 126/80   09/19/17 122/78   09/07/17 132/78    Weight from Last 3 Encounters:   10/13/17 81.2 kg (179 lb)   09/18/17 84.4 kg (186 lb 1.1 oz)   09/07/17 83.7 kg (184 lb 8 oz)              We Performed the Following     COMPLEX UROFLOWMETRY     POST-VOID RESIDUAL BLADDER SCAN        Primary Care Provider Office Phone # Fax #    Larry Mcdaniel [phone]       [facility] [address]  [facility] [address]        Equal Access to Services     [facility]: Hadii aad ku hadasho Soomaali, waaxda luqadaha, qaybta kaalmada adeegyada, waxay idiin hayaan gregeg kharadorian byers . So [facility] [phone].    ATENCIÓN: Si habla español, tiene a pimentel disposición servicios gratuitos de asistencia lingüística. [facility] [phone].    We comply with applicable federal civil rights laws and [place] laws. We do not discriminate on the basis of race, color, national origin, age, disability, sex, sexual orientation, or gender identity.            Thank you!     Thank you for choosing [facility] UROLOGY AND [facility] FOR PROSTATE AND UROLOGIC CANCERS  for your care. Our goal is always to provide you with excellent care. Hearing back from our patients is one way we can continue to improve our services. Please take a few minutes to complete the written survey that you may receive in the mail after your visit with us. Thank you!             Your Updated Medication List - Protect others around you: Learn how to safely use, store and throw away your medicines at www.disposemymeds.org.          This list is accurate as of: 10/13/17 11:59 PM.  " Always use your most recent med list.                   Brand Name Dispense Instructions for use Diagnosis    oxyCODONE 5 MG IR tablet    ROXICODONE    15 tablet    Take 1-2 tablets (5-10 mg) by mouth every 3 hours as needed for moderate to severe pain    Enlarged prostate       phenazopyridine 100 MG tablet    PYRIDIUM    12 tablet    Take 1 tablet (100 mg) by mouth 3 times daily (with meals)    Enlarged prostate

## 2017-10-13 NOTE — NURSING NOTE
Chief Complaint   Patient presents with     RECHECK     post op for Prostatic hypertrophy with lower urinary tract symptoms       Ara Stephen MA

## 2017-12-05 ENCOUNTER — MEDICAL CORRESPONDENCE (OUTPATIENT)
Dept: HEALTH INFORMATION MANAGEMENT | Facility: CLINIC | Age: 52
End: 2017-12-05

## 2017-12-11 ENCOUNTER — TELEPHONE (OUTPATIENT)
Dept: GASTROENTEROLOGY | Facility: CLINIC | Age: 52
End: 2017-12-11

## 2017-12-11 DIAGNOSIS — Z12.11 ENCOUNTER FOR SCREENING COLONOSCOPY: Primary | ICD-10-CM

## 2017-12-11 NOTE — TELEPHONE ENCOUNTER
Patient scheduled for Colonoscopy    Indication for procedure. Screening    Referring Provider. Catalina Harmon PA-C ([facility])    ? Not Needed    Arrival time verified? Yes, 1230    Facility location verified? Yes, [address]    Instructions given regarding prep and procedure    Prep Type Golytely    Are you taking any anticoagulants or blood thinners? No    Instructions given? N/A    Electronic implanted devices? No    Pre procedure teaching completed? Yes    Transportation from procedure? Wife    H&P / Pre op physical completed? N/A

## 2017-12-13 ENCOUNTER — SURGERY (OUTPATIENT)
Age: 52
End: 2017-12-13

## 2017-12-13 ENCOUNTER — HOSPITAL ENCOUNTER (OUTPATIENT)
Facility: AMBULATORY SURGERY CENTER | Age: 52
End: 2017-12-13
Attending: INTERNAL MEDICINE
Payer: COMMERCIAL

## 2017-12-13 VITALS
OXYGEN SATURATION: 97 % | TEMPERATURE: 97.6 F | DIASTOLIC BLOOD PRESSURE: 62 MMHG | RESPIRATION RATE: 16 BRPM | SYSTOLIC BLOOD PRESSURE: 118 MMHG

## 2017-12-13 LAB — COLONOSCOPY: NORMAL

## 2017-12-13 RX ORDER — FENTANYL CITRATE 50 UG/ML
INJECTION, SOLUTION INTRAMUSCULAR; INTRAVENOUS PRN
Status: DISCONTINUED | OUTPATIENT
Start: 2017-12-13 | End: 2017-12-13 | Stop reason: HOSPADM

## 2017-12-13 RX ADMIN — FENTANYL CITRATE 50 MCG: 50 INJECTION, SOLUTION INTRAMUSCULAR; INTRAVENOUS at 13:43

## 2017-12-13 NOTE — IP AVS SNAPSHOT
[facility]    [address] [place] [address]    Phone:  [phone]    Fax:  [phone]                                       After Visit Summary   12/13/2017    Chaitanya Ovalles    MRN: [id number]           After Visit Summary Signature Page     I have received my discharge instructions, and my questions have been answered. I have discussed any challenges I see with this plan with the nurse or doctor.    ..........................................................................................................................................  Patient/Patient Representative Signature      ..........................................................................................................................................  Patient Representative Print Name and Relationship to Patient    ..................................................               ................................................  Date                                            Time    ..........................................................................................................................................  Reviewed by Signature/Title    ...................................................              ..............................................  Date                                                            Time

## 2017-12-13 NOTE — DISCHARGE INSTRUCTIONS
Discharge Instructions after Colonoscopy or Sigmoidoscopy       Today you had a ____ Colonoscopy ____ Sigmoidoscopy       Activity and Diet   You were given medicine for pain. You may be dizzy or sleepy.   For 24 hours:     Do not drive or use heavy equipment.     Do not make important decisions.     Do not drink any alcohol.   You may return to your normal diet and medicines.       Discomfort     Air was placed in your colon during the exam in order to see it. Walking helps to pass the air.     You may take Tylenol (acetaminophen) for pain unless your doctor has told you not to.   Do not take aspirin or ibuprofen (Advil, Motrin, or other anti-inflammatory   drugs) for _____ days.       Follow-up   ____ We took small tissue samples or polyps to study. Your doctor will call you with the results within two weeks.       When to call:       Call right away if you have:     Unusual pain in belly or chest pain not relieved with passing air.     More than 1 to 2 Tablespoons of bleeding from your rectum.     Fever above 100.6  F (37.5  C).       If you have severe pain, bleeding, or shortness of breath, go to an emergency room.       If you have questions, call:   Monday to Friday, 7 a.m. to 4:30 p.m.   Endoscopy: [phone] (We may have to call you back)       After hours   Hospital: [phone] (Ask for the GI fellow on call)

## 2017-12-13 NOTE — IP AVS SNAPSHOT
MRN:[id number]                      After Visit Summary   12/13/2017    Chaitanya Ovalles    MRN: [id number]           Thank you!     Thank you for choosing [facility] for your care. Our goal is always to provide you with excellent care. Hearing back from our patients is one way we can continue to improve our services. Please take a few minutes to complete the written survey that you may receive in the mail after you visit with us. Thank you!        Patient Information     Date Of Birth          1965        About your hospital stay     You were admitted on:  December 13, 2017 You last received care in the:  [facility]    You were discharged on:  December 13, 2017       Who to Call     For medical emergencies, please call 911.  For non-urgent questions about your medical care, please call your primary care provider or clinic, [phone]  For questions related to your surgery, please call your surgery clinic        Attending Provider     Provider Latonya Tellez MD Gastroenterology       Primary Care Provider Office Phone # Fax #    Larry Fawad Mcdaniel [phone]      Your next 10 appointments already scheduled     Dec 16, 2017 10:30 AM CST   (Arrive by 10:15 AM)   Return Visit with Nadine Mena MD   [facility] ([facility])    [address] [place] [address] [phone]              Further instructions from your care team       Discharge Instructions after Colonoscopy or Sigmoidoscopy       Today you had a ____ Colonoscopy ____ Sigmoidoscopy       Activity and Diet   You were given medicine for pain. You may be dizzy or sleepy.   For 24 hours:     Do not drive or use heavy equipment.     Do not make important decisions.     Do not drink any alcohol.   You may return to your normal diet and medicines.       Discomfort      Air was placed in your colon during the exam in order to see it. Walking helps to pass the air.     You may take Tylenol (acetaminophen) for pain unless your doctor has told you not to.   Do not take aspirin or ibuprofen (Advil, Motrin, or other anti-inflammatory   drugs) for _____ days.       Follow-up   ____ We took small tissue samples or polyps to study. Your doctor will call you with the results within two weeks.       When to call:       Call right away if you have:     Unusual pain in belly or chest pain not relieved with passing air.     More than 1 to 2 Tablespoons of bleeding from your rectum.     Fever above 100.6  F (37.5  C).       If you have severe pain, bleeding, or shortness of breath, go to an emergency room.       If you have questions, call:   Monday to Friday, 7 a.m. to 4:30 p.m.   Endoscopy: [phone] (We may have to call you back)       After hours   Hospital: [phone] (Ask for the GI fellow on call)     Pending Results     No orders found from 12/11/2017 to 12/14/2017.            Admission Information     Date & Time Provider Department Dept. Phone    12/13/2017 Latonya Carias MD [facility] [phone]      Your Vitals Were     Blood Pressure Respirations Pulse Oximetry             117/81 23 99%         [employer] Information     SMR[employer]T gives you secure access to your electronic health record. If you see a primary care provider, you can also send messages to your care team and make appointments. If you have questions, please call your primary care clinic.  If you do not have a primary care provider, please call [phone] and they will assist you.      SMRxT is an electronic gateway that provides easy, online access to your medical records. With SMRxT, you can request a clinic appointment, read your test results, renew a prescription or communicate with your care team.     To access your existing account, please contact your [facility]  [facility]icians Clinic or call [phone] for assistance.        Care EveryWhere ID     This is your Care EveryWhere ID. This could be used by other organizations to access your [facility] medical records  [id number]        Equal Access to Services     MICHAEL LEE : Hadii aad ku hadrufinabar Lily, waaxda luqadaha, qaybta kaalmada adefranciscoyada, lasha kelleyarleth garzafrancisco rosario zeeshan faria. So [facility] [phone].    ATENCIÓN: Si habla español, tiene a pimentel disposición servicios gratuitos de asistencia lingüística. Llame al [phone].    We comply with applicable federal civil rights laws and [place] laws. We do not discriminate on the basis of race, color, national origin, age, disability, sex, sexual orientation, or gender identity.               Review of your medicines      UNREVIEWED medicines. Ask your doctor about these medicines        Dose / Directions    oxyCODONE IR 5 MG tablet   Commonly known as:  ROXICODONE   Used for:  Enlarged prostate        Dose:  5-10 mg   Take 1-2 tablets (5-10 mg) by mouth every 3 hours as needed for moderate to severe pain   Quantity:  15 tablet   Refills:  0       phenazopyridine 100 MG tablet   Commonly known as:  PYRIDIUM   Used for:  Enlarged prostate        Dose:  100 mg   Take 1 tablet (100 mg) by mouth 3 times daily (with meals)   Quantity:  12 tablet   Refills:  0                Protect others around you: Learn how to safely use, store and throw away your medicines at www.disposemymeds.org.             Medication List: This is a list of all your medications and when to take them. Check marks below indicate your daily home schedule. Keep this list as a reference.      Medications           Morning Afternoon Evening Bedtime As Needed    oxyCODONE IR 5 MG tablet   Commonly known as:  ROXICODONE   Take 1-2 tablets (5-10 mg) by mouth every 3 hours as needed for moderate to severe pain                                phenazopyridine 100 MG tablet   Commonly known as:  PYRIDIUM   Take 1  tablet (100 mg) by mouth 3 times daily (with meals)

## 2017-12-16 ENCOUNTER — OFFICE VISIT (OUTPATIENT)
Dept: UROLOGY | Facility: CLINIC | Age: 52
End: 2017-12-16
Payer: COMMERCIAL

## 2017-12-16 VITALS
WEIGHT: 183.5 LBS | BODY MASS INDEX: 27.81 KG/M2 | HEART RATE: 56 BPM | HEIGHT: 68 IN | DIASTOLIC BLOOD PRESSURE: 86 MMHG | SYSTOLIC BLOOD PRESSURE: 145 MMHG

## 2017-12-16 DIAGNOSIS — R35.0 URINARY FREQUENCY: ICD-10-CM

## 2017-12-16 DIAGNOSIS — R35.1 NOCTURIA: ICD-10-CM

## 2017-12-16 DIAGNOSIS — N52.9 ERECTILE DYSFUNCTION, UNSPECIFIED ERECTILE DYSFUNCTION TYPE: Primary | ICD-10-CM

## 2017-12-16 RX ORDER — SILDENAFIL CITRATE 20 MG/1
20 TABLET ORAL PRN
Qty: 30 TABLET | Refills: 3 | Status: SHIPPED | OUTPATIENT
Start: 2017-12-16 | End: 2018-12-29

## 2017-12-16 ASSESSMENT — PAIN SCALES - GENERAL: PAINLEVEL: NO PAIN (0)

## 2017-12-16 NOTE — NURSING NOTE
Chief Complaint   Patient presents with     Surgical Followup     Patient is coming in to see  for a post op for prostatic hypertrophy with lower urinary tract symptoms.     Mona Do

## 2017-12-16 NOTE — MR AVS SNAPSHOT
After Visit Summary   12/16/2017    Chaitanya Ovalles    MRN: [id number]           Patient Information     Date Of Birth          1965        Visit Information        Provider Department      12/16/2017 10:30 AM Nadine Mena MD [facility] and [facility] Pro[facility]rologic C[facility]        Today's Diagnoses     Erectile dysfunction, unspecified erectile dysfunction type    -  1    Urinary frequency        Nocturia          Care Instructions    Return in six months with a full bladder for a flow/pvr.            Follow-ups after your visit        Who to contact     Please call your clinic at [phone] to:    Ask questions about your health    Make or cancel appointments    Discuss your medicines    Learn about your test results    Speak to your doctor   If you have compliments or concerns about an experience at your clinic, or if you wish to file a complaint, please contact [facility] Patient Relations at [phone] or email us at [email or web address]         Additional Information About Your Visit        MyChart Information     "[employer]t gives you secure access to your electronic health record. If you see a primary care provider, you can also send messages to your care team and make appointments. If you have questions, please call your primary care clinic.  If you do not have a primary care provider, please call [phone] and they will assist you.      [employer] is an electronic gateway that provides easy, online access to your medical records. With [employer], you can request a clinic appointment, read your test results, renew a prescription or communicate with your care team.     To access your existing account, please contact your [facility] or call [phone] for assistance.        Care EveryWhere ID     This is your Care EveryWhere ID. This could be used by other organizations to access your [place]  "medical records  [id number]        Your Vitals Were     Pulse Height BMI (Body Mass Index)             56 1.727 m (5' 8\") 27.9 kg/m2          Blood Pressure from Last 3 Encounters:   12/16/17 145/86   12/13/17 118/62   10/13/17 126/80    Weight from Last 3 Encounters:   12/16/17 83.2 kg (183 lb 8 oz)   10/13/17 81.2 kg (179 lb)   09/18/17 84.4 kg (186 lb 1.1 oz)              Today, you had the following     No orders found for display         Today's Medication Changes          These changes are accurate as of: 12/16/17 11:59 PM.  If you have any questions, ask your nurse or doctor.               Start taking these medicines.        Dose/Directions    sildenafil 20 MG tablet   Commonly known as:  REVATIO   Used for:  Erectile dysfunction, unspecified erectile dysfunction type   Started by:  Nadine Mena MD        Dose:  20 mg   Take 1 tablet (20 mg) by mouth as needed   Quantity:  30 tablet   Refills:  3            Where to get your medicines      Some of these will need a paper prescription and others can be bought over the counter.  Ask your nurse if you have questions.     Bring a paper prescription for each of these medications     sildenafil 20 MG tablet                Primary Care Provider Office Phone # Fax #    Larry Fawad Mcdaniel [phone]       [facility] [address] [facility] [address]  [facility] [address]        Equal Access to Services     [facility] AH: Hadii arpan montgomery hadasho Sogamal, waaxda luqadaha, qaybta kaalmada cindy, lasha byers . So [facility] [phone].    ATENCIÓN: Si habla español, tiene a pimentel disposición servicios gratuitos de asistencia lingüística. Llame al [phone].    We comply with applicable federal civil rights laws and [place] laws. We do not discriminate on the basis of race, color, national origin, age, disability, sex, sexual orientation, or gender identity.            Thank you!     Thank you for choosing [facility] " UROLOGY AND INST FOR PROSTATE AND UROLOGIC CANCERS  for your care. Our goal is always to provide you with excellent care. Hearing back from our patients is one way we can continue to improve our services. Please take a few minutes to complete the written survey that you may receive in the mail after your visit with us. Thank you!             Your Updated Medication List - Protect others around you: Learn how to safely use, store and throw away your medicines at www.disposemymeds.org.          This list is accurate as of: 12/16/17 11:59 PM.  Always use your most recent med list.                   Brand Name Dispense Instructions for use Diagnosis    oxyCODONE IR 5 MG tablet    ROXICODONE    15 tablet    Take 1-2 tablets (5-10 mg) by mouth every 3 hours as needed for moderate to severe pain    Enlarged prostate       phenazopyridine 100 MG tablet    PYRIDIUM    12 tablet    Take 1 tablet (100 mg) by mouth 3 times daily (with meals)    Enlarged prostate       sildenafil 20 MG tablet    REVATIO    30 tablet    Take 1 tablet (20 mg) by mouth as needed    Erectile dysfunction, unspecified erectile dysfunction type

## 2017-12-16 NOTE — LETTER
12/16/2017       RE: Chaitanya Ovalles  [address] [facility] [address]     Dear Colleague,    Thank you for referring your patient, Chaitanya Ovalles, to the [facility] UROLOGY AND INST FOR PROSTATE AND UROLOGIC CANCERS at [facility]. Please see a copy of my visit note below.    UROLOGIC DIAGNOSES:       CURRENT INTERVENTIONS:       HISTORY:     Patient presents for elevated PSA and lower urinary tract symptoms.  IN 2014, patient had elevated PSA (6.8) and had a biopsy that was negative.   He was followed by outside urologist. Had cystoscopy that did not show stricture or other significant findings. Had not yet had UDS.      Notes that he continues to have some lower urinary tract symptoms despite taking tamsulosin (8mg, per patient report).     Patient had cystoscopy that showed significant trilobar hypertrophy.   Patient s/p prostate biopsy for PIRADS 3 lesion and elevated PSA. No evidence of prostate cancer on the specimen.     Currently s/p TURP and states that his stream is excellent and that his symptoms have improved. He is states he is pleased with the outcome.   Patient's symptoms have remained stable since last visit. We discussed his current symptoms and well as longer term follow up.     Patient notes some mild ED at this visit. We discussed sildenafil use and I instructed the patient on its use.           PAST MEDICAL HISTORY:   Past Medical History:   Diagnosis Date     BPH (benign prostatic hypertrophy) with urinary obstruction      Elevated PSA        PAST SURGICAL HISTORY:   Past Surgical History:   Procedure Laterality Date     COLONOSCOPY N/A 12/13/2017    Procedure: COLONOSCOPY;  Colon;  Surgeon: Latonya Carias MD;  [facility]     CYSTOSCOPY, TRANSURETHRAL RESECTION (TUR) PROSTATE, COMBINED N/A 9/18/2017    Procedure: COMBINED CYSTOSCOPY, TRANSURETHRAL RESECTION (TUR) PROSTATE;  Cystoscopy, Transurethral Resection of the Prostate;  Surgeon:  "Nadine Mena MD;  [facility]     NO HISTORY OF SURGERY         FAMILY HISTORY: No family history on file.    SOCIAL HISTORY:   Social History   Substance Use Topics     Smoking status: Never Smoker     Smokeless tobacco: Never Used     Alcohol use Not on file      Comment: social       Current Outpatient Prescriptions   Medication     sildenafil (REVATIO) 20 MG tablet     oxyCODONE (ROXICODONE) 5 MG IR tablet     phenazopyridine (PYRIDIUM) 100 MG tablet     No current facility-administered medications for this visit.          PHYSICAL EXAM:    /86  Pulse 56  Ht 1.727 m (5' 8\")  Wt 83.2 kg (183 lb 8 oz)  BMI 27.9 kg/m2    HEENT: Normocephalic and atraumatic   Cardiac: Not done  Back/Flank: Not done  CNS/PNS: Not done  Respiratory: Normal non-labored breathing  Abdomen: Soft nontender and nondistended  Peripheral Vascular: Not done  Mental Status: Not done    Penis: Not done  Scrotal Skin: Not done  Testicles: Not done  Epididymis: Not done  Digital Rectal Exam:     Cystoscopy: Not done    Imaging: None    Urinalysis: UA RESULTS:  No results for input(s): COLOR, APPEARANCE, URINEGLC, URINEBILI, URINEKETONE, SG, UBLD, URINEPH, PROTEIN, UROBILINOGEN, NITRITE, LEUKEST, RBCU, WBCU in the last 81525 hours.    PSA:   VV 87 ml  Q max 12.5 ml/s   Post Void Residual: 34ml    Other labs: None today      IMPRESSION:  53 y/o M with elevated PSA, lower urinary tract symptoms despite medication, significant trilobar hypertrophy       PLAN:  Rx for sildenafil given   Uroflow/PVR in six months   Follow up in six months     Total Time: 15 minutes                                     Total in Consultation: greater than 50%       Again, thank you for allowing me to participate in the care of your patient.      Sincerely,    Nadine Mena MD      "

## 2017-12-16 NOTE — PROGRESS NOTES
UROLOGIC DIAGNOSES:       CURRENT INTERVENTIONS:       HISTORY:     Patient presents for elevated PSA and lower urinary tract symptoms.  IN 2014, patient had elevated PSA (6.8) and had a biopsy that was negative.   He was followed by outside urologist. Had cystoscopy that did not show stricture or other significant findings. Had not yet had UDS.      Notes that he continues to have some lower urinary tract symptoms despite taking tamsulosin (8mg, per patient report).     Patient had cystoscopy that showed significant trilobar hypertrophy.   Patient s/p prostate biopsy for PIRADS 3 lesion and elevated PSA. No evidence of prostate cancer on the specimen.     Currently s/p TURP and states that his stream is excellent and that his symptoms have improved. He is states he is pleased with the outcome.   Patient's symptoms have remained stable since last visit. We discussed his current symptoms and well as longer term follow up.     Patient notes some mild ED at this visit. We discussed sildenafil use and I instructed the patient on its use.           PAST MEDICAL HISTORY:   Past Medical History:   Diagnosis Date     BPH (benign prostatic hypertrophy) with urinary obstruction      Elevated PSA        PAST SURGICAL HISTORY:   Past Surgical History:   Procedure Laterality Date     COLONOSCOPY N/A 12/13/2017    Procedure: COLONOSCOPY;  Colon;  Surgeon: Latonya Carias MD;  [facility]     CYSTOSCOPY, TRANSURETHRAL RESECTION (TUR) PROSTATE, COMBINED N/A 9/18/2017    Procedure: COMBINED CYSTOSCOPY, TRANSURETHRAL RESECTION (TUR) PROSTATE;  Cystoscopy, Transurethral Resection of the Prostate;  Surgeon: Nadine Mena MD;  [facility]     NO HISTORY OF SURGERY         FAMILY HISTORY: No family history on file.    SOCIAL HISTORY:   Social History   Substance Use Topics     Smoking status: Never Smoker     Smokeless tobacco: Never Used     Alcohol use Not on file      Comment: social       Current Outpatient  "Prescriptions   Medication     sildenafil (REVATIO) 20 MG tablet     oxyCODONE (ROXICODONE) 5 MG IR tablet     phenazopyridine (PYRIDIUM) 100 MG tablet     No current facility-administered medications for this visit.          PHYSICAL EXAM:    /86  Pulse 56  Ht 1.727 m (5' 8\")  Wt 83.2 kg (183 lb 8 oz)  BMI 27.9 kg/m2    HEENT: Normocephalic and atraumatic   Cardiac: Not done  Back/Flank: Not done  CNS/PNS: Not done  Respiratory: Normal non-labored breathing  Abdomen: Soft nontender and nondistended  Peripheral Vascular: Not done  Mental Status: Not done    Penis: Not done  Scrotal Skin: Not done  Testicles: Not done  Epididymis: Not done  Digital Rectal Exam:     Cystoscopy: Not done    Imaging: None    Urinalysis: UA RESULTS:  No results for input(s): COLOR, APPEARANCE, URINEGLC, URINEBILI, URINEKETONE, SG, UBLD, URINEPH, PROTEIN, UROBILINOGEN, NITRITE, LEUKEST, RBCU, WBCU in the last 53274 hours.    PSA:   VV 87 ml  Q max 12.5 ml/s   Post Void Residual: 34ml    Other labs: None today      IMPRESSION:  51 y/o M with elevated PSA, lower urinary tract symptoms despite medication, significant trilobar hypertrophy       PLAN:  Rx for sildenafil given   Uroflow/PVR in six months   Follow up in six months     Total Time: 15 minutes                                     Total in Consultation: greater than 50%     "

## 2018-06-05 ENCOUNTER — PRE VISIT (OUTPATIENT)
Dept: UROLOGY | Facility: CLINIC | Age: 53
End: 2018-06-05

## 2018-06-05 NOTE — TELEPHONE ENCOUNTER
Patient is coming in to see  for a flow/PVR, called patient and left a message to please come with a full bladder for a flow/PVR

## 2018-06-16 ENCOUNTER — OFFICE VISIT (OUTPATIENT)
Dept: UROLOGY | Facility: CLINIC | Age: 53
End: 2018-06-16
Payer: COMMERCIAL

## 2018-06-16 VITALS
HEIGHT: 68 IN | BODY MASS INDEX: 27.28 KG/M2 | SYSTOLIC BLOOD PRESSURE: 123 MMHG | WEIGHT: 180 LBS | DIASTOLIC BLOOD PRESSURE: 72 MMHG | HEART RATE: 75 BPM

## 2018-06-16 DIAGNOSIS — R35.0 URINARY FREQUENCY: ICD-10-CM

## 2018-06-16 DIAGNOSIS — N40.0 ENLARGED PROSTATE: Primary | ICD-10-CM

## 2018-06-16 ASSESSMENT — PAIN SCALES - GENERAL: PAINLEVEL: NO PAIN (0)

## 2018-06-16 NOTE — LETTER
6/16/2018       RE: Chaitanya Ovalles  [address] [facility] [address]     Dear Colleague,    Thank you for referring your patient, Chaitanya Ovalles, to the [facility] UROLOGY AND INST FOR PROSTATE AND UROLOGIC CANCERS at [facility]. Please see a copy of my visit note below.    UROLOGIC DIAGNOSES:       CURRENT INTERVENTIONS:       HISTORY:     Patient presents for elevated PSA and lower urinary tract symptoms.  IN 2014, patient had elevated PSA (6.8) and had a biopsy that was negative.   He was followed by outside urologist. Had cystoscopy that did not show stricture or other significant findings. Had not yet had UDS.      Notes that he continues to have some lower urinary tract symptoms despite taking tamsulosin (8mg, per patient report).     Patient had cystoscopy that showed significant trilobar hypertrophy.   Patient s/p prostate biopsy for PIRADS 3 lesion and elevated PSA. No evidence of prostate cancer on the specimen.     Currently s/p TURP and states that his stream is excellent and that his symptoms have improved. He is states he is pleased with the outcome.     Patient notes some new onset urinary frequency since last visit. No other symptoms noted besides that. Also states that sildenafil was not effective for ED. Upon questioning, he did not take it on an empty stomach.     We discussed possible bnc or other source of new onset frequency. Also discussed sildenafil use.     PAST MEDICAL HISTORY:   Past Medical History:   Diagnosis Date     BPH (benign prostatic hypertrophy) with urinary obstruction      Elevated PSA        PAST SURGICAL HISTORY:   Past Surgical History:   Procedure Laterality Date     COLONOSCOPY N/A 12/13/2017    Procedure: COLONOSCOPY;  Colon;  Surgeon: Latonya Carias MD;  [facility]     CYSTOSCOPY, TRANSURETHRAL RESECTION (TUR) PROSTATE, COMBINED N/A 9/18/2017    Procedure: COMBINED CYSTOSCOPY, TRANSURETHRAL RESECTION (TUR) PROSTATE;   "Cystoscopy, Transurethral Resection of the Prostate;  Surgeon: Nadine Mena MD;  [facility]     NO HISTORY OF SURGERY         FAMILY HISTORY: No family history on file.    SOCIAL HISTORY:   Social History   Substance Use Topics     Smoking status: Never Smoker     Smokeless tobacco: Never Used     Alcohol use Not on file      Comment: social       Current Outpatient Prescriptions   Medication     oxyCODONE (ROXICODONE) 5 MG IR tablet     phenazopyridine (PYRIDIUM) 100 MG tablet     sildenafil (REVATIO) 20 MG tablet     No current facility-administered medications for this visit.      PHYSICAL EXAM:  /72  Pulse 75  Ht 1.727 m (5' 8\")  Wt 81.6 kg (180 lb)  BMI 27.37 kg/m2    HEENT: Normocephalic and atraumatic   Cardiac: Not done  Back/Flank: Not done  CNS/PNS: Not done  Respiratory: Normal non-labored breathing  Abdomen: Soft nontender and nondistended  Peripheral Vascular: Not done  Mental Status: Not done    Penis: Not done  Scrotal Skin: Not done  Testicles: Not done  Epididymis: Not done  Digital Rectal Exam:     Cystoscopy: Not done    Imaging: None    Urinalysis: UA RESULTS:  No results for input(s): COLOR, APPEARANCE, URINEGLC, URINEBILI, URINEKETONE, SG, UBLD, URINEPH, PROTEIN, UROBILINOGEN, NITRITE, LEUKEST, RBCU, WBCU in the last 46522 hours.    PSA:   VV 52 ml  Q max 10.3 ml/s   Post Void Residual: 58ml    Other labs: None today    IMPRESSION:  51 y/o M with elevated PSA, lower urinary tract symptoms despite medication, significant trilobar hypertrophy with improvement in symptoms post op but now with newer onset frequency     PLAN:  Schedule for cystoscopy next available   Re-trial of sildenafil     Total Time: 15 minutes                                     Total in Consultation: greater than 50%       Again, thank you for allowing me to participate in the care of your patient.      Sincerely,    Nadine Mena MD    "

## 2018-06-16 NOTE — PROGRESS NOTES
UROLOGIC DIAGNOSES:       CURRENT INTERVENTIONS:       HISTORY:     Patient presents for elevated PSA and lower urinary tract symptoms.  IN 2014, patient had elevated PSA (6.8) and had a biopsy that was negative.   He was followed by outside urologist. Had cystoscopy that did not show stricture or other significant findings. Had not yet had UDS.      Notes that he continues to have some lower urinary tract symptoms despite taking tamsulosin (8mg, per patient report).     Patient had cystoscopy that showed significant trilobar hypertrophy.   Patient s/p prostate biopsy for PIRADS 3 lesion and elevated PSA. No evidence of prostate cancer on the specimen.     Currently s/p TURP and states that his stream is excellent and that his symptoms have improved. He is states he is pleased with the outcome.     Patient notes some new onset urinary frequency since last visit. No other symptoms noted besides that. Also states that sildenafil was not effective for ED. Upon questioning, he did not take it on an empty stomach.     We discussed possible bnc or other source of new onset frequency. Also discussed sildenafil use.           PAST MEDICAL HISTORY:   Past Medical History:   Diagnosis Date     BPH (benign prostatic hypertrophy) with urinary obstruction      Elevated PSA        PAST SURGICAL HISTORY:   Past Surgical History:   Procedure Laterality Date     COLONOSCOPY N/A 12/13/2017    Procedure: COLONOSCOPY;  Colon;  Surgeon: Latonya Carias MD;  [facility]     CYSTOSCOPY, TRANSURETHRAL RESECTION (TUR) PROSTATE, COMBINED N/A 9/18/2017    Procedure: COMBINED CYSTOSCOPY, TRANSURETHRAL RESECTION (TUR) PROSTATE;  Cystoscopy, Transurethral Resection of the Prostate;  Surgeon: Nadine Mena MD;  [facility]     NO HISTORY OF SURGERY         FAMILY HISTORY: No family history on file.    SOCIAL HISTORY:   Social History   Substance Use Topics     Smoking status: Never Smoker     Smokeless tobacco: Never Used  "    Alcohol use Not on file      Comment: social       Current Outpatient Prescriptions   Medication     oxyCODONE (ROXICODONE) 5 MG IR tablet     phenazopyridine (PYRIDIUM) 100 MG tablet     sildenafil (REVATIO) 20 MG tablet     No current facility-administered medications for this visit.          PHYSICAL EXAM:    /72  Pulse 75  Ht 1.727 m (5' 8\")  Wt 81.6 kg (180 lb)  BMI 27.37 kg/m2    HEENT: Normocephalic and atraumatic   Cardiac: Not done  Back/Flank: Not done  CNS/PNS: Not done  Respiratory: Normal non-labored breathing  Abdomen: Soft nontender and nondistended  Peripheral Vascular: Not done  Mental Status: Not done    Penis: Not done  Scrotal Skin: Not done  Testicles: Not done  Epididymis: Not done  Digital Rectal Exam:     Cystoscopy: Not done    Imaging: None    Urinalysis: UA RESULTS:  No results for input(s): COLOR, APPEARANCE, URINEGLC, URINEBILI, URINEKETONE, SG, UBLD, URINEPH, PROTEIN, UROBILINOGEN, NITRITE, LEUKEST, RBCU, WBCU in the last 58862 hours.    PSA:   VV 52 ml  Q max 10.3 ml/s   Post Void Residual: 58ml    Other labs: None today      IMPRESSION:  51 y/o M with elevated PSA, lower urinary tract symptoms despite medication, significant trilobar hypertrophy with improvement in symptoms post op but now with newer onset frequency       PLAN:  Schedule for cystoscopy next available   Re-trial of sildenafil     Total Time: 15 minutes                                     Total in Consultation: greater than 50%     "

## 2018-06-16 NOTE — MR AVS SNAPSHOT
After Visit Summary   6/16/2018    Chaitanya Ovalles    MRN: [id number]           Patient Information     Date Of Birth          1965        Visit Information        Provider Department      6/16/2018 11:45 AM Nadine Mena MD [facility] Urology and [facility] for Prostate and Urologic Cancers        Today's Diagnoses     Enlarged prostate    -  1       Follow-ups after your visit        Your next 10 appointments already scheduled     Jun 16, 2018 11:45 AM CDT   (Arrive by 11:30 AM)   Return Visit with Nadine Mena MD   [facility] Urology and [facility]rolog[facility] ([facility])    [address] [place] [id number] [phone]            Jul 13, 2018  1:00 PM CDT   (Arrive by 12:45 PM)   Cystoscopy with Nadine Mena MD   [facility] and [facility] Urologic [facility] ([facility])    [address] [place] [address] [phone]              Who to contact     Please call your clinic at [phone] to:    Ask questions about your health    Make or cancel appointments    Discuss your medicines    Learn about your test results    Speak to your doctor            Additional Information About Your Visit        [employer] Information     [employer] gives you secure access to your electronic health record. If you see a primary care provider, you can also send messages to your care team and make appointments. If you have questions, please call your primary care clinic.  If you do not have a primary care provider, please call [phone] and they will assist you.      [employer] is an electronic gateway that provides easy, online access to your medical records. With [employer], you can request a clinic appointment, read your test results, renew a prescription or communicate with your care team.     To access your existing account, please contact your [facility]  [facility] or call [phone] for assistance.        Care EveryWhere ID     This is your Care EveryWhere ID. This could be used by other organizations to access your [facility] medical records  [id number]        Your Vitals Were     Pulse Height BMI (Body Mass Index)             75 1.727 m (5' 8\") 27.37 kg/m2          Blood Pressure from Last 3 Encounters:   06/16/18 123/72   12/16/17 145/86   12/13/17 118/62    Weight from Last 3 Encounters:   06/16/18 81.6 kg (180 lb)   12/16/17 83.2 kg (183 lb 8 oz)   10/13/17 81.2 kg (179 lb)              We Performed the Following     POST-VOID RESIDUAL BLADDER SCAN        Primary Care Provider Office Phone # Fax #    Larry Mcdaniel [phone]       [facility] [address]  [facility] [address]        Equal Access to Services     [facility]IR[facility] Center[facility]CELIA : Hadii aad ku hadasho Soomaali, waaxda luqadaha, qaybta kaalmada adeegyada, waxay idiin haysuleman erlinda byers . So [facility] [place] [phone].    ATENCIÓN: Si prasanth espjulius, tiene a pimentel disposición servicios gratuitos de asistencia lingüística. Mary Alice al [phone].    We comply with applicable federal civil rights laws and [place] laws. We do not discriminate on the basis of race, color, national origin, age, disability, sex, sexual orientation, or gender identity.            Thank you!     Thank you for choosing [facility] UROLOGY AND [facility] FOR PROSTATE AND UROLOGIC CANCERS  for your care. Our goal is always to provide you with excellent care. Hearing back from our patients is one way we can continue to improve our services. Please take a few minutes to complete the written survey that you may receive in the mail after your visit with us. Thank you!             Your Updated Medication List - Protect others around you: Learn how to safely use, store and throw away your medicines at www.disposemymeds.org.          This list is accurate as of 6/16/18 11:17 AM.  Always use your most " recent med list.                   Brand Name Dispense Instructions for use Diagnosis    oxyCODONE IR 5 MG tablet    ROXICODONE    15 tablet    Take 1-2 tablets (5-10 mg) by mouth every 3 hours as needed for moderate to severe pain    Enlarged prostate       phenazopyridine 100 MG tablet    PYRIDIUM    12 tablet    Take 1 tablet (100 mg) by mouth 3 times daily (with meals)    Enlarged prostate       sildenafil 20 MG tablet    REVATIO    30 tablet    Take 1 tablet (20 mg) by mouth as needed    Erectile dysfunction, unspecified erectile dysfunction type

## 2018-06-27 ENCOUNTER — PRE VISIT (OUTPATIENT)
Dept: UROLOGY | Facility: CLINIC | Age: 53
End: 2018-06-27

## 2018-10-16 ENCOUNTER — PRE VISIT (OUTPATIENT)
Dept: UROLOGY | Facility: CLINIC | Age: 53
End: 2018-10-16

## 2018-11-02 ENCOUNTER — OFFICE VISIT (OUTPATIENT)
Dept: UROLOGY | Facility: CLINIC | Age: 53
End: 2018-11-02
Payer: COMMERCIAL

## 2018-11-02 VITALS
SYSTOLIC BLOOD PRESSURE: 123 MMHG | WEIGHT: 177.2 LBS | DIASTOLIC BLOOD PRESSURE: 70 MMHG | BODY MASS INDEX: 26.94 KG/M2 | HEART RATE: 75 BPM

## 2018-11-02 DIAGNOSIS — R35.0 URINARY FREQUENCY: Primary | ICD-10-CM

## 2018-11-02 RX ORDER — ALFUZOSIN HYDROCHLORIDE 10 MG/1
10 TABLET, EXTENDED RELEASE ORAL DAILY
Qty: 30 TABLET | Refills: 3 | Status: SHIPPED | OUTPATIENT
Start: 2018-11-02 | End: 2020-02-20

## 2018-11-02 NOTE — PROCEDURES
The following medication was given:     MEDICATION:  Lidocaine without epinephrine  ROUTE: Topical  SITE: Urethra   DOSE: 2%  LOT #: [id number]  : [employer]   EXPIRATION DATE:   NDC#: [address]-3   Was there drug waste? No    Kavon Garza CMA    Invasive Procedure Safety Checklist:    Procedure:     Action: Complete sections and checkboxes as appropriate.    Pre-procedure:  1. Patient ID Verified with 2 identifiers (Deborah and  or MRN) : YES    2. Procedure and site verified with patient/designee (when able) : YES    3. Accurate consent documentation in medical record : YES    4. H&P (or appropriate assessment) documented in medical record : YES  H&P must be up to 30 days prior to procedure an updated within 24 hours of                 Procedure as applicable.     5. Relevant diagnostic and radiology test results appropriately labeled and displayed as applicable : YES    6. Blood products, implants, devices, and/or special equipment available for the procedure as applicable : NO    7. Procedure site(s) marked with provider initials [Exclusions: none] : NO    8. Marking not required. Reason : Yes  Procedure does not require site marking    Time Out:     Time-Out performed immediately prior to starting procedure, including verbal and active participation of all team members addressing: YES    1. Correct patient identity.  2. Confirmed that the correct side and site are marked.  3. An accurate procedure to be done.  4. Agreement on the procedure to be done.  5. Correct patient position.  6. Relevant images and results are properly labeled and appropriately displayed.  7. The need to administer antibiotics or fluids for irrigation purposes during the procedure as applicable.  8. Safety precautions based on patient history or medication use.    During Procedure: Verification of correct person, site, and procedure occurs any time the responsibility for care of the patient is transferred to another member of  the care team.    Kavon Garza, CMA

## 2018-11-02 NOTE — LETTER
11/2/2018       RE: Chaitanya Ovalles  [address] [facility] [address]     Dear Colleague,    Thank you for referring your patient, Chaitanya Ovalles, to the [facility] UROLOGY AND INST FOR PROSTATE AND UROLOGIC CANCERS at [facility]. Please see a copy of my visit note below.        PRE-PROCEDURE DIAGNOSIS: urinary frequency, intermittent stream   POST-PROCEDURE DIAGNOSIS: same   PROCEDURE: Cystoscopy  HISTORY: Chaitanya Ovalles is a 52 year old male with lower urinary tract symptoms recurrent after TURP   REVIEW OF OFFICE STUDIES (e.g., uroflow or PVR):   DESCRIPTION OF PROCEDURE: After informed consent was obtained, the patient was brought to the procedure room where he was placed in the supine position with all pressure points well padded.  The penis and scrotum were prepped and draped in a sterile fashion. A flexible cystoscope was introduced through a well-lubricated urethra. Anterior urethra strictures were absent.   The urinary sphincter was intact.  The prostate demonstrated continued bilobar hypertrophy, TUR defect noted, superior portion of the prostate intravesical.  Bladder neck was open.   Bladder signififcant for presence of the following:      Diverticuli: absent      Cellules: absent      Trabeculation: present 1      Tumors: absent      Stones: absent  The flexible cystoscope was removed and the findings were described to the patient.   ASSESSMENT AND PLAN: 52 year old male with lower urinary tract symptoms much improved with TURP now with new median lobe component (from anterior prostate, bladder neck with TUR defect)   Will re-start trial of alpha blocker   Follow up in eight weeks with uroflow/PVR     Again, thank you for allowing me to participate in the care of your patient.      Sincerely,    Nadine Mena MD

## 2018-11-02 NOTE — PROGRESS NOTES
PRE-PROCEDURE DIAGNOSIS: urinary frequency, intermittent stream   POST-PROCEDURE DIAGNOSIS: same   PROCEDURE: Cystoscopy  HISTORY: Chaitanya Ovalles is a 52 year old male with lower urinary tract symptoms recurrent after TURP   REVIEW OF OFFICE STUDIES (e.g., uroflow or PVR):   DESCRIPTION OF PROCEDURE: After informed consent was obtained, the patient was brought to the procedure room where he was placed in the supine position with all pressure points well padded.  The penis and scrotum were prepped and draped in a sterile fashion. A flexible cystoscope was introduced through a well-lubricated urethra. Anterior urethra strictures were absent.   The urinary sphincter was intact.  The prostate demonstrated continued bilobar hypertrophy, TUR defect noted, superior portion of the prostate intravesical.  Bladder neck was open.   Bladder signififcant for presence of the following:      Diverticuli: absent      Cellules: absent      Trabeculation: present 1      Tumors: absent      Stones: absent  The flexible cystoscope was removed and the findings were described to the patient.   ASSESSMENT AND PLAN: 52 year old male with lower urinary tract symptoms much improved with TURP now with new median lobe component (from anterior prostate, bladder neck with TUR defect)   Will re-start trial of alpha blocker   Follow up in eight weeks with uroflow/PVR

## 2018-11-02 NOTE — PATIENT INSTRUCTIONS
Cystoscopy    Cystoscopy is a procedure that lets your doctor look directly inside your urethra and bladder. It can be used to:    Help diagnose a problem with your urethra, bladder, or kidneys.    Take a sample (biopsy) of bladder or urethral tissue.    Treat certain problems (such as removing kidney stones).    Place a stent to bypass an obstruction.    Take special X-rays of the kidneys.  Based on the findings, your doctor may recommend other tests or treatments.  What is a cystoscope?  A cystoscope is a telescope-like instrument that contains lenses and fiberoptics (small glass wires that make bright light). The cystoscope may be straight and rigid, or flexible to bend around curves in the urethra. The doctor may look directly into the cystoscope, or project the image onto a monitor.  Getting ready    Ask your doctor if you should stop taking any medicines before the procedure.    Ask whether you should avoid eating or drinking anything after midnight before the procedure.    Follow any other instructions your doctor gives you.  Tell your doctor before the exam if you:    Take any medicines, such as aspirin or blood thinners    Have allergies to any medicines    Are pregnant   The procedure  Cystoscopy is done in the doctor s office, surgery center, or hospital. The doctor and a nurse are present during the procedure. It takes only a few minutes, longer if a biopsy, X-ray, or treatment needs to be done.  During the procedure:    You lie on an exam table on your back, knees bent and legs apart. You are covered with a drape.    Your urethra and the area around it are washed. Anesthetic jelly may be applied to numb the urethra. Other pain medicine is usually not needed. In some cases, you may be offered a mild sedative to help you relax. If a more extensive procedure is to be done, such as a biopsy or kidney stone removal, general anesthesia may be needed.    The cystoscope is inserted. A sterile fluid is put  "into the bladder to expand it. You may feel pressure from this fluid.    When the procedure is done, the cystoscope is removed.  After the procedure  If you had a sedative, general anesthesia, or spinal anesthesia, you must have someone drive you home. Once you re home:    Drink plenty of fluids.    You may have burning or light bleeding when you urinate--this is normal.    Medicines may be prescribed to ease any discomfort or prevent infection. Take these as directed.    Call your doctor if you have heavy bleeding or blood clots, burning that lasts more than a day, a fever over 100 F  (38  C), or trouble urinating.  Date Last Reviewed: 1/1/2017 2000-2017 [employer]. [address] [place] [address], [place] [id number]. All rights reserved. This information is not intended as a substitute for professional medical care. Always follow your healthcare professional's instructions.          AFTER YOUR CYSTOSCOPY        You have just completed a cystoscopy, or \"cysto\", which allowed your physician to learn more about your bladder (or to remove a stent placed after surgery). We suggest that you continue to avoid caffeine, fruit juice, and alcohol for the next 24 hours, however, you are encouraged to return to your normal activities.         A few things that are considered normal after your cystoscopy:     * Small amount of bleeding (or spotting) that clears within the next 24 hours     * Slight burning sensation with urination     * Sensation to of needing to avoid more frequently     * The feeling of \"air\" in your urine     * Mild discomfort that is relieved with Tylenol        Please contact our office promptly if you:     * Develop a fever above 101 degrees     * Are unable to urinate     * Develop bright red blood that does not stop     * Severe pain or swelling         Please contact our office with any concerns or questions @DEPHN.  "

## 2018-11-02 NOTE — MR AVS SNAPSHOT
After Visit Summary   11/2/2018    Chaitanya Ovalles    MRN: [id number]           Patient Information     Date Of Birth          1965        Visit Information        Provider Department      11/2/2018 10:30 AM Nadine Mena MD [facility]        Today's Diagnoses     Urinary frequency    -  1      Care Instructions          Cystoscopy    Cystoscopy is a procedure that lets your doctor look directly inside your urethra and bladder. It can be used to:    Help diagnose a problem with your urethra, bladder, or kidneys.    Take a sample (biopsy) of bladder or urethral tissue.    Treat certain problems (such as removing kidney stones).    Place a stent to bypass an obstruction.    Take special X-rays of the kidneys.  Based on the findings, your doctor may recommend other tests or treatments.  What is a cystoscope?  A cystoscope is a telescope-like instrument that contains lenses and fiberoptics (small glass wires that make bright light). The cystoscope may be straight and rigid, or flexible to bend around curves in the urethra. The doctor may look directly into the cystoscope, or project the image onto a monitor.  Getting ready    Ask your doctor if you should stop taking any medicines before the procedure.    Ask whether you should avoid eating or drinking anything after midnight before the procedure.    Follow any other instructions your doctor gives you.  Tell your doctor before the exam if you:    Take any medicines, such as aspirin or blood thinners    Have allergies to any medicines    Are pregnant   The procedure  Cystoscopy is done in the doctor s office, surgery center, or hospital. The doctor and a nurse are present during the procedure. It takes only a few minutes, longer if a biopsy, X-ray, or treatment needs to be done.  During the procedure:    You lie on an exam table on your back, knees bent and legs apart. You are covered with a  drape.    Your urethra and the area around it are washed. Anesthetic jelly may be applied to numb the urethra. Other pain medicine is usually not needed. In some cases, you may be offered a mild sedative to help you relax. If a more extensive procedure is to be done, such as a biopsy or kidney stone removal, general anesthesia may be needed.    The cystoscope is inserted. A sterile fluid is put into the bladder to expand it. You may feel pressure from this fluid.    When the procedure is done, the cystoscope is removed.  After the procedure  If you had a sedative, general anesthesia, or spinal anesthesia, you must have someone drive you home. Once you re home:    Drink plenty of fluids.    You may have burning or light bleeding when you urinate--this is normal.    Medicines may be prescribed to ease any discomfort or prevent infection. Take these as directed.    Call your doctor if you have heavy bleeding or blood clots, burning that lasts more than a day, a fever over 100 F  (38  C), or trouble urinating.  Date Last Reviewed: 1/1/2017 2000-2017 [employer]. [address] [place] [address]. All rights reserved. This information is not intended as a substitute for professional medical care. Always follow your healthcare professional's instructions.                Follow-ups after your visit        Follow-up notes from your care team     Return in about 3 months (around 2/2/2019), or return 2-3 month to follow up Cystoscopy and medication .      Your next 10 appointments already scheduled     Nov 08, 2018  1:30 PM CST   (Arrive by 1:15 PM)   New Patient Visit with Kevin Huddleston MD   [facility] ([facility])    [address] [place] [id number] [phone]              Who to contact     Please call your clinic at [phone] to:    Ask questions about your health    Make or cancel appointments    Discuss your  medicines    Learn about your test results    Speak to your doctor            Additional Information About Your Visit        [employer] Information     [employer] gives you secure access to your electronic health record. If you see a primary care provider, you can also send messages to your care team and make appointments. If you have questions, please call your primary care clinic.  If you do not have a primary care provider, please call [phone] and they will assist you.      [employer] is an electronic gateway that provides easy, online access to your medical records. With [employer], you can request a clinic appointment, read your test results, renew a prescription or communicate with your care team.     To access your existing account, please contact your [facility] or call [phone] for assistance.        Care EveryWhere ID     This is your Care EveryWhere ID. This could be used by other organizations to access your [facility] medical records  [id number]        Your Vitals Were     Pulse BMI (Body Mass Index)                75 26.94 kg/m2           Blood Pressure from Last 3 Encounters:   11/02/18 123/70   06/16/18 123/72   12/16/17 145/86    Weight from Last 3 Encounters:   11/02/18 80.4 kg (177 lb 3.2 oz)   06/16/18 81.6 kg (180 lb)   12/16/17 83.2 kg (183 lb 8 oz)              Today, you had the following     No orders found for display         Today's Medication Changes          These changes are accurate as of 11/2/18 11:46 AM.  If you have any questions, ask your nurse or doctor.               Start taking these medicines.        Dose/Directions    alfuzosin 10 MG 24 hr tablet   Commonly known as:  UROXATRAL   Used for:  Urinary frequency   Started by:  Nadine Mena MD        Dose:  10 mg   Take 1 tablet (10 mg) by mouth daily   Quantity:  30 tablet   Refills:  3            Where to get your medicines      These medications were sent to [facility] -  [place], [place] - [address] [facility] [address]  [address] [facility], [place] [address]     Phone:  [phone]     alfuzosin 10 MG 24 hr tablet                Primary Care Provider Office Phone # Fax #    Larry Fawad Mcdaniel [phone]       [facility] [address]  [facility] [address]        Equal Access to Services     [facility]: Hadii aad ku hadasho Soomaali, waaxda luqadaha, qaybta kaalmada adeegyada, waxay idiin hayaan adeeg kharash la'aan . So [facility] [phone].    ATENCIÓN: Si habla español, tiene a pimentel disposición servicios gratuitos de asistencia lingüística. [facility] [phone].    We comply with applicable federal civil rights laws and [place] laws. We do not discriminate on the basis of race, color, national origin, age, disability, sex, sexual orientation, or gender identity.            Thank you!     Thank you for choosing [facility]  for your care. Our goal is always to provide you with excellent care. Hearing back from our patients is one way we can continue to improve our services. Please take a few minutes to complete the written survey that you may receive in the mail after your visit with us. Thank you!             Your Updated Medication List - Protect others around you: Learn how to safely use, store and throw away your medicines at www.disposemymeds.org.          This list is accurate as of 11/2/18 11:46 AM.  Always use your most recent med list.                   Brand Name Dispense Instructions for use Diagnosis    alfuzosin 10 MG 24 hr tablet    UROXATRAL    30 tablet    Take 1 tablet (10 mg) by mouth daily    Urinary frequency       oxyCODONE IR 5 MG tablet    ROXICODONE    15 tablet    Take 1-2 tablets (5-10 mg) by mouth every 3 hours as needed for moderate to severe pain    Enlarged prostate       phenazopyridine 100 MG tablet    PYRIDIUM    12 tablet    Take 1 tablet (100 mg) by  mouth 3 times daily (with meals)    Enlarged prostate       sildenafil 20 MG tablet    REVATIO    30 tablet    Take 1 tablet (20 mg) by mouth as needed    Erectile dysfunction, unspecified erectile dysfunction type

## 2018-11-02 NOTE — NURSING NOTE
Chief Complaint   Patient presents with     Cystoscopy       The following medication was given:     MEDICATION:  Lidocaine without epinephrine  ROUTE: Topical  SITE: Urethra   DOSE: 2%  LOT #: [id number]  : Fanergies   EXPIRATION DATE:   NDC#: [address]-1   Was there drug waste? No    Kavon Garza CMA    Invasive Procedure Safety Checklist:    Procedure:     Action: Complete sections and checkboxes as appropriate.    Pre-procedure:  1. Patient ID Verified with 2 identifiers (Deborah and  or MRN) : YES    2. Procedure and site verified with patient/designee (when able) : YES    3. Accurate consent documentation in medical record : YES    4. H&P (or appropriate assessment) documented in medical record : YES  H&P must be up to 30 days prior to procedure an updated within 24 hours of                 Procedure as applicable.     5. Relevant diagnostic and radiology test results appropriately labeled and displayed as applicable : YES    6. Blood products, implants, devices, and/or special equipment available for the procedure as applicable : NO    7. Procedure site(s) marked with provider initials [Exclusions: none] : NO    8. Marking not required. Reason : Yes  Procedure does not require site marking    Time Out:     Time-Out performed immediately prior to starting procedure, including verbal and active participation of all team members addressing: YES    1. Correct patient identity.  2. Confirmed that the correct side and site are marked.  3. An accurate procedure to be done.  4. Agreement on the procedure to be done.  5. Correct patient position.  6. Relevant images and results are properly labeled and appropriately displayed.  7. The need to administer antibiotics or fluids for irrigation purposes during the procedure as applicable.  8. Safety precautions based on patient history or medication use.    During Procedure: Verification of correct person, site, and procedure occurs any time the responsibility for  care of the patient is transferred to another member of the care team.    Kavon Garza, CMA

## 2018-11-05 LAB — COPATH REPORT: NORMAL

## 2018-11-07 ASSESSMENT — ENCOUNTER SYMPTOMS
EXERCISE INTOLERANCE: 0
ORTHOPNEA: 0
LIGHT-HEADEDNESS: 0
HYPERTENSION: 0
SYNCOPE: 0
PALPITATIONS: 1
HYPOTENSION: 0
SLEEP DISTURBANCES DUE TO BREATHING: 0
LEG PAIN: 0

## 2018-11-08 ENCOUNTER — OFFICE VISIT (OUTPATIENT)
Dept: INTERNAL MEDICINE | Facility: CLINIC | Age: 53
End: 2018-11-08
Payer: COMMERCIAL

## 2018-11-08 VITALS
SYSTOLIC BLOOD PRESSURE: 125 MMHG | WEIGHT: 180 LBS | HEART RATE: 96 BPM | OXYGEN SATURATION: 99 % | BODY MASS INDEX: 27.37 KG/M2 | DIASTOLIC BLOOD PRESSURE: 81 MMHG

## 2018-11-08 DIAGNOSIS — Z00.00 WELLNESS EXAMINATION: Primary | ICD-10-CM

## 2018-11-08 DIAGNOSIS — Z00.00 WELLNESS EXAMINATION: ICD-10-CM

## 2018-11-08 LAB
CHOLEST SERPL-MCNC: 252 MG/DL
HDLC SERPL-MCNC: 48 MG/DL
LDLC SERPL CALC-MCNC: 139 MG/DL
NONHDLC SERPL-MCNC: 203 MG/DL
PSA SERPL-ACNC: 7.19 UG/L (ref 0–4)
TRIGL SERPL-MCNC: 319 MG/DL

## 2018-11-08 ASSESSMENT — ENCOUNTER SYMPTOMS
MUSCLE CRAMPS: 0
EYE WATERING: 0
NECK PAIN: 0
INCREASED ENERGY: 0
SPUTUM PRODUCTION: 0
DIFFICULTY URINATING: 0
NIGHT SWEATS: 0
INSOMNIA: 0
ORTHOPNEA: 0
SINUS CONGESTION: 0
RESPIRATORY PAIN: 0
EXTREMITY NUMBNESS: 0
ALTERED TEMPERATURE REGULATION: 0
COUGH: 0
ARTHRALGIAS: 0
RECTAL BLEEDING: 0
STIFFNESS: 0
BACK PAIN: 0
CONSTIPATION: 0
FATIGUE: 0
BLOATING: 0
MYALGIAS: 0
LEG PAIN: 0
BLOOD IN STOOL: 0
POLYDIPSIA: 0
EYE PAIN: 0
FEVER: 0
DYSURIA: 0
DIZZINESS: 0
DOUBLE VISION: 0
NERVOUS/ANXIOUS: 0
HYPOTENSION: 0
TASTE DISTURBANCE: 0
JAUNDICE: 0
BRUISES/BLEEDS EASILY: 0
NUMBNESS: 0
VOMITING: 0
WEAKNESS: 0
SHORTNESS OF BREATH: 0
HALLUCINATIONS: 0
EXERCISE INTOLERANCE: 0
DECREASED CONCENTRATION: 0
TINGLING: 0
JOINT SWELLING: 0
PANIC: 0
SEIZURES: 0
HEARTBURN: 0
HEMATURIA: 0
SKIN CHANGES: 0
RECTAL PAIN: 0
WEIGHT GAIN: 0
WEIGHT LOSS: 0
MEMORY LOSS: 0
SPEECH CHANGE: 0
DIARRHEA: 0
LOSS OF CONSCIOUSNESS: 0
HOARSE VOICE: 0
LIGHT-HEADEDNESS: 0
WHEEZING: 0
MUSCLE WEAKNESS: 0
CHILLS: 0
BOWEL INCONTINENCE: 0
SMELL DISTURBANCE: 0
TROUBLE SWALLOWING: 0
POOR WOUND HEALING: 0
DISTURBANCES IN COORDINATION: 0
ABDOMINAL PAIN: 0
SNORES LOUDLY: 0
NAUSEA: 0
SWOLLEN GLANDS: 0
SORE THROAT: 0
TREMORS: 0
POLYPHAGIA: 0
FLANK PAIN: 0
HEADACHES: 0
PALPITATIONS: 1
EYE REDNESS: 0
PARALYSIS: 0
DYSPNEA ON EXERTION: 0
EYE IRRITATION: 0
HEMOPTYSIS: 0
POSTURAL DYSPNEA: 0
NAIL CHANGES: 0
SYNCOPE: 0
SLEEP DISTURBANCES DUE TO BREATHING: 0
HYPERTENSION: 0
DECREASED APPETITE: 0
SINUS PAIN: 0
NECK MASS: 0
DEPRESSION: 0
COUGH DISTURBING SLEEP: 0

## 2018-11-08 ASSESSMENT — PAIN SCALES - GENERAL: PAINLEVEL: NO PAIN (0)

## 2018-11-08 NOTE — PROGRESS NOTES
PRIMARY CARE CENTER         HPI:       Chaitanya Ovalles is a 52 year old male with history of BPH s/p TURP who presents to clinic for: Physical (physical- [facility] care )    Patient notes that his last physical was 2 yrs ago.  Since that time he has had a TURP due to BPH that failed treatment with tamsulosin.  Before his TURP, he had an elevated PSA at 8.23 and underwent prostate needle biopsy that was negative for adenocarcinoma.  He is concerned about his PSA level and would like this lab checked again at this visit.  He denies ongoing urinary symptoms (urgency, frequency, hesitancy, nighttime awakenings).      He is also concerned because his Apple Watch says that his resting heart rate is 116 bpm at times.      He works as an .  His wife is always telling him to cut back on his alcohol consumption.  He drinks one bottle of wine per week (~ 2 glasses per day on the weekend).    PMHx:  Past Medical History:   Diagnosis Date     BPH (benign prostatic hypertrophy) with urinary obstruction      Elevated PSA      PSHx:  Past Surgical History:   Procedure Laterality Date     COLONOSCOPY N/A 12/13/2017    Procedure: COLONOSCOPY;  Colon;  Surgeon: Latonya Carias MD;  Location:  OR     CYSTOSCOPY, TRANSURETHRAL RESECTION (TUR) PROSTATE, COMBINED N/A 9/18/2017    Procedure: COMBINED CYSTOSCOPY, TRANSURETHRAL RESECTION (TUR) PROSTATE;  Cystoscopy, Transurethral Resection of the Prostate;  Surgeon: Nadine Mena MD;  Location: [facility] OR     NO HISTORY OF SURGERY       FamHx:  No family history on file.  Mom - DM2, HTN, obese  Dad - passed when pt was 15  No cancer    Allergies:   No Known Allergies    Meds:    Current Outpatient Prescriptions:      alfuzosin (UROXATRAL) 10 MG 24 hr tablet, Take 1 tablet (10 mg) by mouth daily (Patient not taking: Reported on 11/8/2018), Disp: 30 tablet, Rfl: 3     oxyCODONE (ROXICODONE) 5 MG IR tablet, Take 1-2 tablets (5-10 mg) by mouth every 3 hours as needed for  moderate to severe pain (Patient not taking: Reported on 11/8/2018), Disp: 15 tablet, Rfl: 0     phenazopyridine (PYRIDIUM) 100 MG tablet, Take 1 tablet (100 mg) by mouth 3 times daily (with meals) (Patient not taking: Reported on 11/8/2018), Disp: 12 tablet, Rfl: 0     sildenafil (REVATIO) 20 MG tablet, Take 1 tablet (20 mg) by mouth as needed (Patient not taking: Reported on 11/8/2018), Disp: 30 tablet, Rfl: 3    SocHx:  Social History     Social History     Marital status:      Spouse name: N/A     Number of children: N/A     Years of education: N/A     Social History Main Topics     Smoking status: Never Smoker     Smokeless tobacco: Never Used     Alcohol use None      Comment: social     Drug use: None     Sexual activity: Not Asked     Other Topics Concern     None     Social History Narrative       Problem, Medication and Allergy Lists were reviewed and are current.  Patient is a new patient to this clinic and so  I reviewed/updated the Past Medical History, the Family History and the Social History.          Review of Systems:   Review of Systems     Constitutional:  Negative for fever, chills, weight loss, weight gain, fatigue, decreased appetite, night sweats, recent stressors, height gain, height loss, post-operative complications, incisional pain, hallucinations, increased energy, hyperactivity and confused.   HENT:  Negative for ear pain, hearing loss, tinnitus, nosebleeds, trouble swallowing, hoarse voice, mouth sores, sore throat, ear discharge, tooth pain, gum tenderness, taste disturbance, smell disturbance, hearing aid, bleeding gums, dry mouth, sinus pain, sinus congestion and neck mass.    Eyes:  Negative for double vision, pain, redness, eye pain, decreased vision, eye watering, eye bulging, eye dryness, flashing lights, spots, floaters, strabismus, tunnel vision, jaundice and eye irritation.   Respiratory:   Negative for cough, hemoptysis, sputum production, shortness of breath,  wheezing, sleep disturbances due to breathing, snores loudly, respiratory pain, dyspnea on exertion, cough disturbing sleep and postural dyspnea.    Cardiovascular:  Positive for palpitations. Negative for chest pain, dyspnea on exertion, orthopnea, fingers/toes turn blue, hypertension, hypotension, syncope, history of heart murmur, pacemaker, few scattered varicosities, leg pain, sleep disturbances due to breathing, light-headedness, exercise intolerance and edema.   Gastrointestinal:  Negative for heartburn, nausea, vomiting, abdominal pain, diarrhea, constipation, blood in stool, melena, rectal pain, bloating, hemorrhoids, bowel incontinence, jaundice, rectal bleeding, coffee ground emesis and change in stool.   Genitourinary:  Negative for bladder incontinence, dysuria, urgency, hematuria, flank pain, difficulty urinating, nocturia, voiding less frequently, scrotal pain, ulcerations, penile discharge, male genitourinary complaint and reduced libido.   Musculoskeletal:  Negative for myalgias, back pain, joint swelling, arthralgias, stiffness, muscle cramps, neck pain, bone pain, muscle weakness and fracture.   Skin:  Negative for nail changes, itching, poor wound healing, rash, hair changes, skin changes, acne, warts, poor wound healing, scarring, flaky skin, Raynaud's phenomenon, sensitivity to sunlight and skin thickening.   Neurological:  Negative for dizziness, tingling, tremors, speech change, seizures, loss of consciousness, weakness, light-headedness, numbness, headaches, disturbances in coordination, extremity numbness, memory loss, difficulty walking and paralysis.   Endo/Heme:  Negative for anemia, swollen glands and bruises/bleeds easily.   Psychiatric/Behavioral:  Negative for depression, hallucinations, memory loss, decreased concentration, mood swings and panic attacks.    Endocrine:  Negative for altered temperature regulation, polyphagia, polydipsia, unwanted hair growth and change in facial  hair.    I have personally reviewed and updated the complete ROS on the day of the visit.           Physical Exam:   /81 (BP Location: Right arm, Patient Position: Sitting)  Pulse 96  Wt 81.6 kg (180 lb)  SpO2 99%  BMI 27.37 kg/m2  Body mass index is 27.37 kg/(m^2).  Vitals were reviewed       GENERAL APPEARANCE: healthy, alert and no distress     EYES: EOMI,  PERRL     HENT: NCAT, nose and mouth without ulcers or lesions     NECK: no adenopathy, no asymmetry, masses, or scars      RESP: lungs clear to auscultation - no rales, rhonchi or wheezes     CV: regular rates and rhythm, normal S1 S2, no S3 or S4 and no murmur, click or rub     ABDOMEN:  soft, nontender, no HSM or masses and bowel sounds normal     MS: extremities normal- no gross deformities noted, no evidence of inflammation in joints, FROM in all extremities.     SKIN: no suspicious lesions or rashes     NEURO: Normal strength and tone, sensory exam grossly normal, mentation intact and speech normal     PSYCH: mentation appears normal. and affect normal/bright      Results:     Office Visit on 11/02/2018   Component Date Value Ref Range Status     Copath Report 11/02/2018    Final                    Value:Patient Name: SUHAS MARTINO  MR#: [id number]  Specimen #: [id number]  Collected: 11/2/2018  Received: 11/5/2018  Reported: 11/5/2018 12:49  Ordering Phy(s): SANTOSH KEYES  Additional Phy(s): CORNELIO MO    For improved result formatting, select 'View Enhanced Report Format' under   Linked Documents section.    SPECIMEN/STAIN PROCESS:  Urine, voided       Pap-Cyto x 1    ----------------------------------------------------------------    CYTOLOGIC INTERPRETATION:     Urine, voided:  - Negative for High-Grade Urothelial Carcinoma  Specimen Adequacy: Satisfactory for evaluation.  - Scant urothelial cells present.    I have personally reviewed all specimens and/or slides, including the   listed special stains, and used  them  with my medical judgement to determine or confirm the final diagnosis.    Electronically signed out by:  Dre Stephenson M.D., [facility]    Processed and screened at [facility]    CLIN                          ICAL HISTORY:  Patient presented with an enlarged prostate and an increase in urinary   frequency.    ,    GROSS:  Urine, voided:  Received 80 ml of yellow, slightly hazy fluid, processed   as 1 Pap stained Autocyte..    MICROSCOPIC:  Microscopic review is performed.    Robert Preciado MD (Cytopathology fellow); Dre Stephenson MD   (Attending)    CPT Codes:  A: 92259-WIOWYNG    TESTING LAB LOCATION:  [facility], [address]   [address] [phone]    COLLECTION SITE:  Client:  [facility]  Location:  UCUROP (B)    Resident  [id number]         Lab Results   Component Value Date    WBC 6.9 09/19/2017    HGB 12.7 (L) 09/19/2017    HCT 39.0 (L) 09/19/2017     09/19/2017     09/19/2017    POTASSIUM 4.0 09/19/2017    CHLORIDE 105 09/19/2017    CO2 25 09/19/2017    BUN 18 09/19/2017    CR 0.97 09/19/2017     (H) 09/19/2017       Assessment and Plan     Chaitanya Ovalles is a 52 year old male with history of BPH s/p TURP who presents to clinic to establish care.    # Wellness examination  Discussed with patient that his alcohol consumption is not excessive.  Encouraged him to increase his cardiovascular exercise and to increase his consumption of lean meats, leafy green vegetables, beans and almonds (the latter to increase his calcium consumption, per his stated concerns).  Discussed the pros and cons of regular PSA testing.  Noted that due to his ethnicity and his prio elevated PSA, repeating this test would be reasonable.  Will check fasting lipids.    -     PSA screen; Future  -     Lipid Profile FASTING;  Future    Options for treatment and follow-up care were reviewed with the patient. Chaitanya Ovalles engaged in the decision making process and verbalized understanding of the options discussed and agreed with the final plan.    Kevin Huddleston, DO  Nov 8, 2018    Pt was seen and plan of care discussed with Dr. Chanel.     Pt was seen and examined with Dr. Huddleston.  I agree with his documentation as noted above.    My additional comments: Had colonoscopy in past year    Sheng Chanel MD

## 2018-11-08 NOTE — MR AVS SNAPSHOT
After Visit Summary   11/8/2018    Chaitanya Ovalles    MRN: [id number]           Patient Information     Date Of Birth          1965        Visit Information        Provider Department      11/8/2018 1:30 PM Kevin Huddleston,  [facility]        Today's Diagnoses     Wellness examination    -  1       Follow-ups after your visit        Your next 10 appointments already scheduled     Nov 08, 2018  3:30 PM CST   LAB with  LAB   [facility] ([facility])    [address] [place] [address] [phone]           Please do not eat 10-12 hours before your appointment if you are coming in fasting for labs on lipids, cholesterol, or glucose (sugar). This does not apply to pregnant women. Water, hot tea and black coffee (with nothing added) are okay. Do not drink other fluids, diet soda or chew gum.            Feb 08, 2019  3:45 PM CST   (Arrive by 3:30 PM)   Return Visit with Nadine Mena MD   [facility] ([facility])    [address] [place] [id number] [phone]              Future tests that were ordered for you today     Open Future Orders        Priority Expected Expires Ordered    PSA screen Routine 11/8/2018 11/8/2019 11/8/2018    Lipid Profile FASTING Routine 11/8/2018 11/8/2019 11/8/2018            Who to contact     Please call your clinic at [phone] to:    Ask questions about your health    Make or cancel appointments    Discuss your medicines    Learn about your test results    Speak to your doctor            Additional Information About Your Visit        MyChart Information     [employer] gives you secure access to your electronic health record. If you see a primary care provider, you can also send messages to your care team and make appointments. If you have questions, please call your primary  [facility] clinic.  If you do not have a primary care provider, please call [phone] and they will assist you.      [employer] is an electronic gateway that provides easy, online access to your medical records. With [employer], you can request a clinic appointment, read your test results, renew a prescription or communicate with your care team.     To access your existing account, please contact your [facility] or call [phone] for assistance.        Care EveryWhere ID     This is your Care EveryWhere ID. This could be used by other organizations to access your [facility] medical records  [id number]        Your Vitals Were     Pulse Pulse Oximetry BMI (Body Mass Index)             96 99% 27.37 kg/m2          Blood Pressure from Last 3 Encounters:   11/08/18 125/81   11/02/18 123/70   06/16/18 123/72    Weight from Last 3 Encounters:   11/08/18 81.6 kg (180 lb)   11/02/18 80.4 kg (177 lb 3.2 oz)   06/16/18 81.6 kg (180 lb)               Primary Care Provider Office Phone # Fax #    Larry Mcdaniel [phone]       [facility] [address]  [facility] [address]        Equal Access to Services     MICHAEL LEE AH: Hadii aad ku hadasho Soomaali, waaxda luqadaha, qaybta kaalmada adeegyada, waxay idiin haysuleman erlinda rosario lajoyce faria. So [facility] [place] [phone].    ATENCIÓN: Si habla español, tiene a pimentel disposición servicios gratuitos de asistencia lingüística. Llame al [phone].    We comply with applicable federal civil rights laws and [place] laws. We do not discriminate on the basis of race, color, national origin, age, disability, sex, sexual orientation, or gender identity.            Thank you!     Thank you for choosing [facility]  for your care. Our goal is always to provide you with excellent care. Hearing back from our patients is one way we can continue to improve our services. Please take a few minutes to complete the written survey  that you may receive in the mail after your visit with us. Thank you!             Your Updated Medication List - Protect others around you: Learn how to safely use, store and throw away your medicines at www.disposemymeds.org.          This list is accurate as of 11/8/18  3:25 PM.  Always use your most recent med list.                   Brand Name Dispense Instructions for use Diagnosis    alfuzosin 10 MG 24 hr tablet    UROXATRAL    30 tablet    Take 1 tablet (10 mg) by mouth daily    Urinary frequency       oxyCODONE IR 5 MG tablet    ROXICODONE    15 tablet    Take 1-2 tablets (5-10 mg) by mouth every 3 hours as needed for moderate to severe pain    Enlarged prostate       phenazopyridine 100 MG tablet    PYRIDIUM    12 tablet    Take 1 tablet (100 mg) by mouth 3 times daily (with meals)    Enlarged prostate       sildenafil 20 MG tablet    REVATIO    30 tablet    Take 1 tablet (20 mg) by mouth as needed    Erectile dysfunction, unspecified erectile dysfunction type

## 2018-11-08 NOTE — NURSING NOTE
Chief Complaint   Patient presents with     Physical     physical- establish care      Michelle Varghese at 1:53 PM on 11/8/2018.    I faxed an MANDA form with the patient's signature to [facility] [place] at [phone].   Michelle Varghese, EMT at 2:09 PM on 11/8/2018.

## 2018-12-29 DIAGNOSIS — N52.9 ERECTILE DYSFUNCTION, UNSPECIFIED ERECTILE DYSFUNCTION TYPE: ICD-10-CM

## 2019-01-04 NOTE — TELEPHONE ENCOUNTER
sildenafil (REVATIO) 20 MG   Last Written Prescription Date:  12/16/17  Last Fill Quantity: 30,   # refills: 3  Last Office Visit : 11/2/18  Future Office visit:  2/8/19  Routing refill request to provider for review/approval because:  FAILED PROTOCOL

## 2019-01-07 RX ORDER — SILDENAFIL CITRATE 20 MG/1
20 TABLET ORAL DAILY
Qty: 30 TABLET | Refills: 2 | Status: SHIPPED | OUTPATIENT
Start: 2019-01-07 | End: 2020-10-06

## 2019-01-23 ENCOUNTER — PRE VISIT (OUTPATIENT)
Dept: UROLOGY | Facility: CLINIC | Age: 54
End: 2019-01-23

## 2019-02-08 ENCOUNTER — OFFICE VISIT (OUTPATIENT)
Dept: UROLOGY | Facility: CLINIC | Age: 54
End: 2019-02-08
Payer: COMMERCIAL

## 2019-02-08 VITALS
WEIGHT: 180.6 LBS | BODY MASS INDEX: 27.37 KG/M2 | HEART RATE: 62 BPM | SYSTOLIC BLOOD PRESSURE: 131 MMHG | DIASTOLIC BLOOD PRESSURE: 78 MMHG | HEIGHT: 68 IN

## 2019-02-08 DIAGNOSIS — R97.20 ELEVATED PROSTATE SPECIFIC ANTIGEN (PSA): ICD-10-CM

## 2019-02-08 DIAGNOSIS — R35.0 URINARY FREQUENCY: Primary | ICD-10-CM

## 2019-02-08 ASSESSMENT — MIFFLIN-ST. JEOR: SCORE: 1648.7

## 2019-02-08 ASSESSMENT — PAIN SCALES - GENERAL: PAINLEVEL: NO PAIN (0)

## 2019-02-08 NOTE — NURSING NOTE
"Chief Complaint   Patient presents with     RECHECK     BPH follow up       Blood pressure 131/78, pulse 62, height 1.727 m (5' 8\"), weight 81.9 kg (180 lb 9.6 oz). Body mass index is 27.46 kg/m .    Patient Active Problem List   Diagnosis     Erectile dysfunction     Hyperlipidemia     HYPERLIPIDEMIA LDL GOAL <130     Enlarged prostate       No Known Allergies    Current Outpatient Medications   Medication Sig Dispense Refill     sildenafil (REVATIO) 20 MG tablet Take 1 tablet (20 mg) by mouth daily 30 tablet 2     alfuzosin (UROXATRAL) 10 MG 24 hr tablet Take 1 tablet (10 mg) by mouth daily (Patient not taking: Reported on 11/8/2018) 30 tablet 3     oxyCODONE (ROXICODONE) 5 MG IR tablet Take 1-2 tablets (5-10 mg) by mouth every 3 hours as needed for moderate to severe pain (Patient not taking: Reported on 11/8/2018) 15 tablet 0     phenazopyridine (PYRIDIUM) 100 MG tablet Take 1 tablet (100 mg) by mouth 3 times daily (with meals) (Patient not taking: Reported on 11/8/2018) 12 tablet 0       Social History     Tobacco Use     Smoking status: Never Smoker     Smokeless tobacco: Never Used   Substance Use Topics     Alcohol use: Not on file     Comment: social     Drug use: Not on file       BELLA Monzon  2/8/2019  3:43 PM     "

## 2019-02-08 NOTE — LETTER
2/8/2019    RE: Chaitanya Ovalles  [address] [facility] [address]     UROLOGIC DIAGNOSES:       CURRENT INTERVENTIONS:       HISTORY:     Patient presents for elevated PSA and lower urinary tract symptoms.  IN 2014, patient had elevated PSA (6.8) and had a biopsy that was negative.   He was followed by outside urologist. Had cystoscopy that did not show stricture or other significant findings. Had not yet had UDS.      Notes that he continues to have some lower urinary tract symptoms despite taking tamsulosin (8mg, per patient report).     Patient had cystoscopy that showed significant trilobar hypertrophy.   Patient s/p prostate biopsy for PIRADS 3 lesion and elevated PSA. No evidence of prostate cancer on the specimen.     Currently s/p TURP and states that his stream is excellent and that his symptoms have improved. He is states he is pleased with the outcome.     Patient notes some new onset urinary frequency since last visit. No other symptoms noted besides that. Also states that sildenafil was not effective for ED. Upon questioning, he did not take it on an empty stomach.     Had cystoscopy that did not show BNC or other pathology. Some BPH still noted.  Patient notes at this visit that this frequency has resolved. Has not been taking alpha blocker.   Notes that he no longer notes ED and does not use sildenafil.   PSA was 7.19              PAST MEDICAL HISTORY:   Past Medical History:   Diagnosis Date     BPH (benign prostatic hypertrophy) with urinary obstruction      Elevated PSA        PAST SURGICAL HISTORY:   Past Surgical History:   Procedure Laterality Date     COLONOSCOPY N/A 12/13/2017    Procedure: COLONOSCOPY;  Colon;  Surgeon: Latonya Carias MD;  Location: [facility] OR     CYSTOSCOPY, TRANSURETHRAL RESECTION (TUR) PROSTATE, COMBINED N/A 9/18/2017    Procedure: COMBINED CYSTOSCOPY, TRANSURETHRAL RESECTION (TUR) PROSTATE;  Cystoscopy, Transurethral Resection of the Prostate;  Surgeon: Trina  "Nadine Chapman MD;  Location: [facility] OR     NO HISTORY OF SURGERY         FAMILY HISTORY: No family history on file.    SOCIAL HISTORY:   Social History     Tobacco Use     Smoking status: Never Smoker     Smokeless tobacco: Never Used   Substance Use Topics     Alcohol use: Not on file     Comment: social       Current Outpatient Medications   Medication     sildenafil (REVATIO) 20 MG tablet     alfuzosin (UROXATRAL) 10 MG 24 hr tablet     oxyCODONE (ROXICODONE) 5 MG IR tablet     phenazopyridine (PYRIDIUM) 100 MG tablet     No current facility-administered medications for this visit.        PHYSICAL EXAM:    /78   Pulse 62   Ht 1.727 m (5' 8\")   Wt 81.9 kg (180 lb 9.6 oz)   BMI 27.46 kg/m       HEENT: Normocephalic and atraumatic   Cardiac: Not done  Back/Flank: Not done  CNS/PNS: Not done  Respiratory: Normal non-labored breathing  Abdomen: Soft nontender and nondistended  Peripheral Vascular: Not done  Mental Status: Not done    Penis: Not done  Scrotal Skin: Not done  Testicles: Not done  Epididymis: Not done  Digital Rectal Exam:     Cystoscopy: Not done    Imaging: None    Urinalysis: [facility] RESULTS:  No results for input(s): COLOR, APPEARANCE, URINEGLC, URINEBILI, URINEKETONE, SG, UBLD, URINEPH, PROTEIN, UROBILINOGEN, NITRITE, LEUKEST, RBCU, WBCU in the last 06068 hours.    PSA: 7.19   ml  Q max 11.2 ml/s   Post Void Residual: 28ml    Other labs: None today      IMPRESSION:  52 y/o M with elevated PSA, lower urinary tract symptoms despite medication, significant trilobar hypertrophy with improvement in symptoms post op       PLAN:  MRI prostate   If no change from previous, will have PSA in six months   Uroflow/PVR in six months   Follow up in six months       Total Time: 15 minutes                                     Total in Consultation: greater than 50%       Nadine Mena MD    "

## 2019-02-08 NOTE — LETTER
2/8/2019       RE: Chaitanya Ovalles  [address] [facility] [id number]     Dear Colleague,    Thank you for referring your patient, Chaitanya Ovalles, to the [facility] UROLOGY AND INST FOR PROSTATE AND UROLOGIC CANCERS at [facility]. Please see a copy of my visit note below.    UROLOGIC DIAGNOSES:       CURRENT INTERVENTIONS:       HISTORY:     Patient presents for elevated PSA and lower urinary tract symptoms.  IN 2014, patient had elevated PSA (6.8) and had a biopsy that was negative.   He was followed by outside urologist. Had cystoscopy that did not show stricture or other significant findings. Had not yet had UDS.      Notes that he continues to have some lower urinary tract symptoms despite taking tamsulosin (8mg, per patient report).     Patient had cystoscopy that showed significant trilobar hypertrophy.   Patient s/p prostate biopsy for PIRADS 3 lesion and elevated PSA. No evidence of prostate cancer on the specimen.     Currently s/p TURP and states that his stream is excellent and that his symptoms have improved. He is states he is pleased with the outcome.     Patient notes some new onset urinary frequency since last visit. No other symptoms noted besides that. Also states that sildenafil was not effective for ED. Upon questioning, he did not take it on an empty stomach.     Had cystoscopy that did not show BNC or other pathology. Some BPH still noted.  Patient notes at this visit that this frequency has resolved. Has not been taking alpha blocker.   Notes that he no longer notes ED and does not use sildenafil.   PSA was 7.19              PAST MEDICAL HISTORY:   Past Medical History:   Diagnosis Date     BPH (benign prostatic hypertrophy) with urinary obstruction      Elevated PSA        PAST SURGICAL HISTORY:   Past Surgical History:   Procedure Laterality Date     COLONOSCOPY N/A 12/13/2017    Procedure: COLONOSCOPY;  Colon;  Surgeon: Latonya Carias MD;   "Location: [facility]     CYSTOSCOPY, TRANSURETHRAL RESECTION (TUR) PROSTATE, COMBINED N/A 9/18/2017    Procedure: COMBINED CYSTOSCOPY, TRANSURETHRAL RESECTION (TUR) PROSTATE;  Cystoscopy, Transurethral Resection of the Prostate;  Surgeon: Nadine Mena MD;  Location: [facility] OR     NO HISTORY OF SURGERY         FAMILY HISTORY: No family history on file.    SOCIAL HISTORY:   Social History     Tobacco Use     Smoking status: Never Smoker     Smokeless tobacco: Never Used   Substance Use Topics     Alcohol use: Not on file     Comment: social       Current Outpatient Medications   Medication     sildenafil (REVATIO) 20 MG tablet     alfuzosin (UROXATRAL) 10 MG 24 hr tablet     oxyCODONE (ROXICODONE) 5 MG IR tablet     phenazopyridine (PYRIDIUM) 100 MG tablet     No current facility-administered medications for this visit.          PHYSICAL EXAM:    /78   Pulse 62   Ht 1.727 m (5' 8\")   Wt 81.9 kg (180 lb 9.6 oz)   BMI 27.46 kg/m       HEENT: Normocephalic and atraumatic   Cardiac: Not done  Back/Flank: Not done  CNS/PNS: Not done  Respiratory: Normal non-labored breathing  Abdomen: Soft nontender and nondistended  Peripheral Vascular: Not done  Mental Status: Not done    Penis: Not done  Scrotal Skin: Not done  Testicles: Not done  Epididymis: Not done  Digital Rectal Exam:     Cystoscopy: Not done    Imaging: None    Urinalysis: UA RESULTS:  No results for input(s): COLOR, APPEARANCE, URINEGLC, URINEBILI, URINEKETONE, SG, UBLD, URINEPH, PROTEIN, UROBILINOGEN, NITRITE, LEUKEST, RBCU, WBCU in the last 86756 hours.    PSA: 7.19   ml  Q max 11.2 ml/s   Post Void Residual: 28ml    Other labs: None today      IMPRESSION:  54 y/o M with elevated PSA, lower urinary tract symptoms despite medication, significant trilobar hypertrophy with improvement in symptoms post op       PLAN:  MRI prostate   If no change from previous, will have PSA in six months   Uroflow/PVR in six months   Follow up in six months "       Total Time: 15 minutes                                     Total in Consultation: greater than 50%       Again, thank you for allowing me to participate in the care of your patient.      Sincerely,    Nadine Mena MD

## 2019-02-08 NOTE — PATIENT INSTRUCTIONS
Please get MRI   Please follow up in 6 months with a PSA before       It was a pleasure meeting with you today.  Thank you for allowing me and my team the privilege of caring for you today.  YOU are the reason we are here, and I truly hope we provided you with the excellent service you deserve.  Please let us know if there is anything else we can do for you so that we can be sure you are leaving completely satisfied with your care experience.

## 2019-08-06 ENCOUNTER — ANCILLARY PROCEDURE (OUTPATIENT)
Dept: MRI IMAGING | Facility: CLINIC | Age: 54
End: 2019-08-06
Attending: UROLOGY
Payer: COMMERCIAL

## 2019-08-06 DIAGNOSIS — R97.20 ELEVATED PROSTATE SPECIFIC ANTIGEN (PSA): Primary | ICD-10-CM

## 2019-08-06 DIAGNOSIS — R97.20 ELEVATED PROSTATE SPECIFIC ANTIGEN (PSA): ICD-10-CM

## 2019-08-06 LAB — PSA SERPL-MCNC: 10.1 UG/L (ref 0–4)

## 2019-08-06 RX ORDER — GADOBUTROL 604.72 MG/ML
10 INJECTION INTRAVENOUS ONCE
Status: COMPLETED | OUTPATIENT
Start: 2019-08-06 | End: 2019-08-06

## 2019-08-06 RX ADMIN — GADOBUTROL 10 ML: 604.72 INJECTION INTRAVENOUS at 16:04

## 2019-08-07 ENCOUNTER — PRE VISIT (OUTPATIENT)
Dept: UROLOGY | Facility: CLINIC | Age: 54
End: 2019-08-07

## 2019-08-07 NOTE — TELEPHONE ENCOUNTER
Chief Complaint : Return-6 Mo    Hx/Sx: Elevated PSA/TURP    Records/Orders: Available    Pt Contacted: n/a    At Rooming: Flow/PVR

## 2019-08-09 ENCOUNTER — OFFICE VISIT (OUTPATIENT)
Dept: UROLOGY | Facility: CLINIC | Age: 54
End: 2019-08-09
Payer: COMMERCIAL

## 2019-08-09 VITALS
HEIGHT: 68 IN | WEIGHT: 175 LBS | HEART RATE: 68 BPM | SYSTOLIC BLOOD PRESSURE: 138 MMHG | BODY MASS INDEX: 26.52 KG/M2 | DIASTOLIC BLOOD PRESSURE: 84 MMHG

## 2019-08-09 DIAGNOSIS — R97.20 ELEVATED PROSTATE SPECIFIC ANTIGEN (PSA): Primary | ICD-10-CM

## 2019-08-09 ASSESSMENT — PAIN SCALES - GENERAL: PAINLEVEL: NO PAIN (0)

## 2019-08-09 ASSESSMENT — MIFFLIN-ST. JEOR: SCORE: 1623.29

## 2019-08-09 NOTE — PROGRESS NOTES
UROLOGIC DIAGNOSES:       CURRENT INTERVENTIONS:       HISTORY:     Patient presents for elevated PSA and lower urinary tract symptoms.  IN 2014, patient had elevated PSA (6.8) and had a biopsy that was negative.   He was followed by outside urologist. Had cystoscopy that did not show stricture or other significant findings. Had not yet had UDS.      Notes that he continues to have some lower urinary tract symptoms despite taking tamsulosin (8mg, per patient report).     Patient had cystoscopy that showed significant trilobar hypertrophy.   Patient s/p prostate biopsy for PIRADS 3 lesion and elevated PSA. No evidence of prostate cancer on the specimen.     Currently s/p TURP and states that his stream is excellent and that his symptoms have improved. He is states he is pleased with the outcome.     Patient notes some new onset urinary frequency since last visit. No other symptoms noted besides that. Also states that sildenafil was not effective for ED. Upon questioning, he did not take it on an empty stomach.     Had cystoscopy that did not show BNC or other pathology. Some BPH still noted.  Patient notes at this visit that this frequency has resolved. Has not been taking alpha blocker.   Notes that he no longer notes ED and does not use sildenafil.       PSA today was 10.1.   MRI prostate did not show any area suspicious for prostate cancer.     We discussed rising PSA, MRI prostate, voiding symptoms, and further follow up.               PAST MEDICAL HISTORY:   Past Medical History:   Diagnosis Date     BPH (benign prostatic hypertrophy) with urinary obstruction      Elevated PSA        PAST SURGICAL HISTORY:   Past Surgical History:   Procedure Laterality Date     COLONOSCOPY N/A 12/13/2017    Procedure: COLONOSCOPY;  Colon;  Surgeon: Latonya Carias MD;  [facility]     CYSTOSCOPY, TRANSURETHRAL RESECTION (TUR) PROSTATE, COMBINED N/A 9/18/2017    Procedure: COMBINED CYSTOSCOPY, TRANSURETHRAL RESECTION (TUR)  "PROSTATE;  Cystoscopy, Transurethral Resection of the Prostate;  Surgeon: Nadine Mena MD;  Location: [facility] OR     NO HISTORY OF SURGERY         FAMILY HISTORY: History reviewed. No pertinent family history.    SOCIAL HISTORY:   Social History     Tobacco Use     Smoking status: Never Smoker     Smokeless tobacco: Never Used   Substance Use Topics     Alcohol use: Not on file     Comment: social       Current Outpatient Medications   Medication     sildenafil (REVATIO) 20 MG tablet     alfuzosin (UROXATRAL) 10 MG 24 hr tablet     oxyCODONE (ROXICODONE) 5 MG IR tablet     phenazopyridine (PYRIDIUM) 100 MG tablet     No current facility-administered medications for this visit.          PHYSICAL EXAM:    /84   Pulse 68   Ht 1.727 m (5' 8\")   Wt 79.4 kg (175 lb)   BMI 26.61 kg/m      HEENT: Normocephalic and atraumatic   Cardiac: Not done  Back/Flank: Not done  CNS/PNS: Not done  Respiratory: Normal non-labored breathing  Abdomen: Soft nontender and nondistended  Peripheral Vascular: Not done  Mental Status: Not done    Penis: Not done  Scrotal Skin: Not done  Testicles: Not done  Epididymis: Not done  Digital Rectal Exam:     Cystoscopy: Not done    Imaging: None    Urinalysis: UA RESULTS:  No results for input(s): COLOR, APPEARANCE, URINEGLC, URINEBILI, URINEKETONE, SG, UBLD, URINEPH, PROTEIN, UROBILINOGEN, NITRITE, LEUKEST, RBCU, WBCU in the last 93468 hours.    PSA:   VV  ml  Q max 1ml/s   Post Void Residual: ml    Other labs: None today      IMPRESSION:  52 y/o M with elevated PSA, lower urinary tract symptoms despite medication, significant trilobar hypertrophy with improvement in symptoms post op       PLAN:  Uroflow/PVR in six months   PSA in six months   Follow up six months         Total Time: 15 minutes                                     Total in Consultation: greater than 50%     "

## 2019-08-09 NOTE — LETTER
8/9/2019       RE: Chaitanya Ovalles  [address] [facility] [id number]     Dear Colleague,    Thank you for referring your patient, Chaitanya Ovalles, to the [facility] UROLOGY AND INST FOR PROSTATE AND UROLOGIC CANCERS at [facility]. Please see a copy of my visit note below.    UROLOGIC DIAGNOSES:       CURRENT INTERVENTIONS:       HISTORY:     Patient presents for elevated PSA and lower urinary tract symptoms.  IN 2014, patient had elevated PSA (6.8) and had a biopsy that was negative.   He was followed by outside urologist. Had cystoscopy that did not show stricture or other significant findings. Had not yet had UDS.      Notes that he continues to have some lower urinary tract symptoms despite taking tamsulosin (8mg, per patient report).     Patient had cystoscopy that showed significant trilobar hypertrophy.   Patient s/p prostate biopsy for PIRADS 3 lesion and elevated PSA. No evidence of prostate cancer on the specimen.     Currently s/p TURP and states that his stream is excellent and that his symptoms have improved. He is states he is pleased with the outcome.     Patient notes some new onset urinary frequency since last visit. No other symptoms noted besides that. Also states that sildenafil was not effective for ED. Upon questioning, he did not take it on an empty stomach.     Had cystoscopy that did not show BNC or other pathology. Some BPH still noted.  Patient notes at this visit that this frequency has resolved. Has not been taking alpha blocker.   Notes that he no longer notes ED and does not use sildenafil.       PSA today was 10.1.   MRI prostate did not show any area suspicious for prostate cancer.     We discussed rising PSA, MRI prostate, voiding symptoms, and further follow up.               PAST MEDICAL HISTORY:   Past Medical History:   Diagnosis Date     BPH (benign prostatic hypertrophy) with urinary obstruction      Elevated PSA        PAST SURGICAL  "HISTORY:   Past Surgical History:   Procedure Laterality Date     COLONOSCOPY N/A 12/13/2017    Procedure: COLONOSCOPY;  Colon;  Surgeon: Latonya Carias MD;  [facility]     CYSTOSCOPY, TRANSURETHRAL RESECTION (TUR) PROSTATE, COMBINED N/A 9/18/2017    Procedure: COMBINED CYSTOSCOPY, TRANSURETHRAL RESECTION (TUR) PROSTATE;  Cystoscopy, Transurethral Resection of the Prostate;  Surgeon: Nadine Mena MD;  Location: [facility] OR     NO HISTORY OF SURGERY         FAMILY HISTORY: History reviewed. No pertinent family history.    SOCIAL HISTORY:   Social History     Tobacco Use     Smoking status: Never Smoker     Smokeless tobacco: Never Used   Substance Use Topics     Alcohol use: Not on file     Comment: social       Current Outpatient Medications   Medication     sildenafil (REVATIO) 20 MG tablet     alfuzosin (UROXATRAL) 10 MG 24 hr tablet     oxyCODONE (ROXICODONE) 5 MG IR tablet     phenazopyridine (PYRIDIUM) 100 MG tablet     No current facility-administered medications for this visit.          PHYSICAL EXAM:    /84   Pulse 68   Ht 1.727 m (5' 8\")   Wt 79.4 kg (175 lb)   BMI 26.61 kg/m       HEENT: Normocephalic and atraumatic   Cardiac: Not done  Back/Flank: Not done  CNS/PNS: Not done  Respiratory: Normal non-labored breathing  Abdomen: Soft nontender and nondistended  Peripheral Vascular: Not done  Mental Status: Not done    Penis: Not done  Scrotal Skin: Not done  Testicles: Not done  Epididymis: Not done  Digital Rectal Exam:     Cystoscopy: Not done    Imaging: None    Urinalysis: UA RESULTS:  No results for input(s): COLOR, APPEARANCE, URINEGLC, URINEBILI, URINEKETONE, SG, UBLD, URINEPH, PROTEIN, UROBILINOGEN, NITRITE, LEUKEST, RBCU, WBCU in the last [id number] hours.    PSA:   VV  ml  Q max 1ml/s   Post Void Residual: ml    Other labs: None today      IMPRESSION:  52 y/o M with elevated PSA, lower urinary tract symptoms despite medication, significant trilobar hypertrophy with improvement " in symptoms post op       PLAN:  Uroflow/PVR in six months   PSA in six months   Follow up six months         Total Time: 15 minutes                                     Total in Consultation: greater than 50%       Again, thank you for allowing me to participate in the care of your patient.      Sincerely,    Nadine Mena MD

## 2019-08-09 NOTE — NURSING NOTE
"Return-  S/p TURP  Elevated PSA/MRI Review (10.10 PSA) Last 8/19      Chief Complaint   Patient presents with     RECHECK     MRI and PSA Review        Blood pressure 138/84, pulse 68, height 1.727 m (5' 8\"), weight 79.4 kg (175 lb). Body mass index is 26.61 kg/m .    Patient Active Problem List   Diagnosis     Erectile dysfunction     Hyperlipidemia     HYPERLIPIDEMIA LDL GOAL <130     Enlarged prostate       No Known Allergies    Current Outpatient Medications   Medication Sig Dispense Refill     sildenafil (REVATIO) 20 MG tablet Take 1 tablet (20 mg) by mouth daily 30 tablet 2     alfuzosin (UROXATRAL) 10 MG 24 hr tablet Take 1 tablet (10 mg) by mouth daily (Patient not taking: Reported on 11/8/2018) 30 tablet 3     oxyCODONE (ROXICODONE) 5 MG IR tablet Take 1-2 tablets (5-10 mg) by mouth every 3 hours as needed for moderate to severe pain (Patient not taking: Reported on 11/8/2018) 15 tablet 0     phenazopyridine (PYRIDIUM) 100 MG tablet Take 1 tablet (100 mg) by mouth 3 times daily (with meals) (Patient not taking: Reported on 11/8/2018) 12 tablet 0       Social History     Tobacco Use     Smoking status: Never Smoker     Smokeless tobacco: Never Used   Substance Use Topics     Alcohol use: None     Comment: social     Drug use: None       Joseph Cardona, JANNA  8/9/2019  4:19 PM      "

## 2019-08-09 NOTE — PATIENT INSTRUCTIONS
Return in six months with a PSA. Please come with a full bladder for a urine flow test.    It was a pleasure meeting with you today.  Thank you for allowing me and my team the privilege of caring for you today.  YOU are the reason we are here, and I truly hope we provided you with the excellent service you deserve.  Please let us know if there is anything else we can do for you so that we can be sure you are leaving completely satisfied with your care experience.

## 2019-08-25 ENCOUNTER — OFFICE VISIT (OUTPATIENT)
Dept: URGENT CARE | Facility: URGENT CARE | Age: 54
End: 2019-08-25
Payer: COMMERCIAL

## 2019-08-25 ENCOUNTER — TRANSFERRED RECORDS (OUTPATIENT)
Dept: HEALTH INFORMATION MANAGEMENT | Facility: CLINIC | Age: 54
End: 2019-08-25

## 2019-08-25 VITALS
OXYGEN SATURATION: 97 % | BODY MASS INDEX: 26.98 KG/M2 | TEMPERATURE: 98 F | RESPIRATION RATE: 18 BRPM | HEART RATE: 56 BPM | SYSTOLIC BLOOD PRESSURE: 120 MMHG | WEIGHT: 178 LBS | DIASTOLIC BLOOD PRESSURE: 70 MMHG | HEIGHT: 68 IN

## 2019-08-25 DIAGNOSIS — R55 PRE-SYNCOPE: ICD-10-CM

## 2019-08-25 DIAGNOSIS — R07.9 CHEST PAIN, UNSPECIFIED TYPE: Primary | ICD-10-CM

## 2019-08-25 PROCEDURE — 93000 ELECTROCARDIOGRAM COMPLETE: CPT | Performed by: FAMILY MEDICINE

## 2019-08-25 PROCEDURE — 99203 OFFICE O/P NEW LOW 30 MIN: CPT | Performed by: FAMILY MEDICINE

## 2019-08-25 ASSESSMENT — MIFFLIN-ST. JEOR: SCORE: 1636.9

## 2019-08-25 NOTE — PROGRESS NOTES
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  HE HAD DIFFICULTY SPEAKING - got confused   Felt like he might pass out   Lasted few seconds   No chest pain   Went away after few seconds    But that evening was walking on the hallway patient started feeling unsteady like he could fall down so had to hold on the rail   Went away after few seconds    Patient took his /90    Yesterday passing out feeling  Patient was trying to cook breakfast   Was getting dark and blurring of vision    Patient went to state [place] and was fine all day    This morning started having palpitation and chest pinching   Lasted few seconds     Cardiac risk factors:  Diet - controlled HYPERTENSION  Hyperlipidemia  Non smoker    FH  No early CAD    As far as dizziness   Aggravating factors: none   Relieving factors: none   Chest pain or palpitation: Yes  Syncope or presyncope: presyncope  Motor,sensory weakness, or slurring of speech: one episode felt like he was searching for words  Headache: No  - some pressure behind the head   Abdominal pain: No  Recent trauma: No    Tried supportive treatment  At home no relief  Additional Information: above     Current Outpatient Medications   Medication     alfuzosin (UROXATRAL) 10 MG 24 hr tablet     oxyCODONE (ROXICODONE) 5 MG IR tablet     phenazopyridine (PYRIDIUM) 100 MG tablet     sildenafil (REVATIO) 20 MG tablet     No current facility-administered medications for this visit.          Problem list and histories reviewed & adjusted, as indicated.  Additional history: as documented    Problem list, Medication list,  "Allergies, and Medical/Social/Surgical histories reviewed in [facility] and updated as appropriate.    ROS:  Constitutional, HEENT, cardiovascular, pulmonary, gi and gu systems are negative, except as otherwise noted.    OBJECTIVE:                                                    BP (!) 158/83   Pulse 56   Temp 98  F (36.7  C) (Oral)   Resp 18   Ht 1.727 m (5' 8\")   Wt 80.7 kg (178 lb)   SpO2 97%   BMI 27.06 kg/m    Body mass index is 27.06 kg/m .  GENERAL: healthy, alert and no distress  EYES: Eyes grossly normal to inspection, PERRL and conjunctivae and sclerae normal  HENT: ear canals and TM's normal, nose and mouth without ulcers or lesions  NECK: no adenopathy, no asymmetry, masses, or scars and thyroid normal to palpation  RESP: lungs clear to auscultation - no rales, rhonchi or wheezes  CV: regular rate and rhythm, normal S1 S2, no S3 or S4, no murmur, click or rub, no peripheral edema and peripheral pulses strong  ABDOMEN: soft, nontender, no hepatosplenomegaly, no masses and bowel sounds normal  MS: no gross musculoskeletal defects noted, no edema  SKIN: no suspicious lesions or rashes  NEURO: Normal strength and tone, mentation intact and speech normal  PSYCH: mentation appears normal, affect normal/bright    Diagnostic Test Results:  EKG: Normal sinus rhythm nothing acute      ASSESSMENT/PLAN:                                                        ICD-10-CM    1. Chest pain, unspecified type R07.9 EKG 12-lead complete w/read - Clinics   2. Pre-syncope R55    patient given  mg po   No active symptoms at this time.   Concern for cardiac cause of presyncope lightheadedness palpitation and chest pressure.  Concern for arrythmia  Recommend ER evaluation for complete acute cardiorespiratory rule out of symptoms   Patient declined ambulance. Unsure of which ER yet   Wife will drive.   Note given with patient to take as well as EKG      MD Nicky Gray, " MD  [facility]

## 2019-09-03 ENCOUNTER — OFFICE VISIT (OUTPATIENT)
Dept: INTERNAL MEDICINE | Facility: CLINIC | Age: 54
End: 2019-09-03
Payer: COMMERCIAL

## 2019-09-03 VITALS
WEIGHT: 176.4 LBS | BODY MASS INDEX: 26.82 KG/M2 | OXYGEN SATURATION: 99 % | DIASTOLIC BLOOD PRESSURE: 77 MMHG | SYSTOLIC BLOOD PRESSURE: 124 MMHG | HEART RATE: 60 BPM

## 2019-09-03 DIAGNOSIS — E78.5 HYPERLIPIDEMIA LDL GOAL <100: ICD-10-CM

## 2019-09-03 DIAGNOSIS — R07.89 ATYPICAL CHEST PAIN: ICD-10-CM

## 2019-09-03 DIAGNOSIS — R51.9 NONINTRACTABLE EPISODIC HEADACHE, UNSPECIFIED HEADACHE TYPE: Primary | ICD-10-CM

## 2019-09-03 DIAGNOSIS — H53.9 VISION CHANGES: ICD-10-CM

## 2019-09-03 DIAGNOSIS — R51.9 NONINTRACTABLE EPISODIC HEADACHE, UNSPECIFIED HEADACHE TYPE: ICD-10-CM

## 2019-09-03 LAB
ANION GAP SERPL CALCULATED.3IONS-SCNC: 3 MMOL/L (ref 3–14)
BUN SERPL-MCNC: 16 MG/DL (ref 7–30)
CALCIUM SERPL-MCNC: 8.8 MG/DL (ref 8.5–10.1)
CHLORIDE SERPL-SCNC: 106 MMOL/L (ref 94–109)
CHOLEST SERPL-MCNC: 212 MG/DL
CO2 SERPL-SCNC: 28 MMOL/L (ref 20–32)
CREAT SERPL-MCNC: 0.88 MG/DL (ref 0.66–1.25)
CRP SERPL-MCNC: <2.9 MG/L (ref 0–8)
ERYTHROCYTE [SEDIMENTATION RATE] IN BLOOD BY WESTERGREN METHOD: 6 MM/H (ref 0–20)
GFR SERPL CREATININE-BSD FRML MDRD: >90 ML/MIN/{1.73_M2}
GLUCOSE SERPL-MCNC: 90 MG/DL (ref 70–99)
HDLC SERPL-MCNC: 54 MG/DL
LDLC SERPL CALC-MCNC: 127 MG/DL
NONHDLC SERPL-MCNC: 158 MG/DL
POTASSIUM SERPL-SCNC: 4.2 MMOL/L (ref 3.4–5.3)
SODIUM SERPL-SCNC: 138 MMOL/L (ref 133–144)
TRIGL SERPL-MCNC: 157 MG/DL
TSH SERPL DL<=0.005 MIU/L-ACNC: 1.76 MU/L (ref 0.4–4)

## 2019-09-03 ASSESSMENT — PAIN SCALES - GENERAL: PAINLEVEL: NO PAIN (0)

## 2019-09-03 NOTE — NURSING NOTE
Chief Complaint   Patient presents with     Headache     Pt reports severe headache, 10/10 at [facility] F/U     Pt here to follow up on palpitations      Amelia Cook, EMT at 2:44 PM sign on 9/3/2019

## 2019-09-03 NOTE — PROGRESS NOTES
"                     PRIMARY CARE CENTER       SUBJECTIVE:  Chaitanya Ovalles is a 53 year old male with a PMHx of HLD, erectile dysfunction and elevated PSA who comes in for episodic vision changes, headaches and one episode of chest pain. Patient first noticed blurred vision on Friday afternoon, he was sitting and felt \"out of it\" a noted bilateral blurred vision. He denies tunnel vision, had the sensation he may pass out although did not lose consciousness. Lasted for less than one minute, he had several more episodes through Friday and Saturday. Sunday he was sitting in [employer] when he had a short episode of left sided chest pain which lasted for several seconds. This prompted him to go to urgent care where he was referred to the ED, he has a normal appearing EKG, negative troponin and chest x-ray. He was discharged home and has not had any recurrent episodes of chest pain. However, over the past week he has had episodic \"headaches\" which he describes as sharp shooting pain down either side of his face. He feels like it is difficult for him to full open his eyes on that side when the pain occurs. He has been taking 600 mg of ibuprofen which results generally resolves the pain within 30 minutes, he has needed to do this several times per day. He has felt well since yesterday however. He denies any weakness, numbness in his arms or legs. No tremor. No weight loss. He follows with Urology for a mildly elevated PSA but denies new urinary symptoms. He denies chest pain or palpitations with exertion. No history of syncope. His sister had her thyroid removed years ago for unspecified reasons. No family history of early heart disease or stroke.     Medications and allergies reviewed by me today.     ROS:   Constitutional, neuro, ENT, endocrine, pulmonary, cardiac, gastrointestinal, genitourinary, musculoskeletal, integument and psychiatric systems are negative, except as otherwise noted.    OBJECTIVE:    /77 (BP " Location: Right arm, Patient Position: Sitting, Cuff Size: Adult Regular)   Pulse 60   Wt 80 kg (176 lb 6.4 oz)   SpO2 99%   BMI 26.82 kg/m     Wt Readings from Last 1 Encounters:   09/03/19 80 kg (176 lb 6.4 oz)       GENERAL APPEARANCE: healthy, alert and no distress     EYES: EOMI, pupils equal and reactive to light bilaterally as well as accomodation      HENT: Mouth without ulcers or lesions, no oropharyngeal erythema or exudate.      NECK: no adenopathy, no asymmetry, and thyroid normal to palpation     RESP: lungs clear to auscultation - no rales, rhonchi or wheezes     CV: regular rates and rhythm, normal S1 S2, no murmur, click or rub     ABDOMEN:  soft, nontender, no HSM or masses and bowel sounds normal     MS: extremities normal- no gross deformities noted     SKIN: no suspicious lesions or rashes     NEURO: CN II-XII intact, no facial asymmetry noted. Strength 5/5 in upper and lower extremities, no pronator drift. No resting tremor. Normal tone. Unable to elicit patellar or brachioradialis reflex bilaterally. No tactile deficits.       PSYCH: mentation appears normal. and affect normal/bright     LYMPHATICS: No cervical adenopathy     ASSESSMENT/PLAN:  Chaitanya was seen today for headache and hospital f/u.    Diagnoses and all orders for this visit:    Nonintractable episodic headache, unspecified headache type  Vision changes   Atypical Chest Pain  Constellation of symptoms difficult to formulate into a unifying diagnosing. He has not had recurrent chest pain since being evaluated in urgent care, no palpitations. Cardiac cause of his symptoms seems unlikely. Considerations include migraine with aura although visual symptom and headaches have not occurred together. I think it is reasonable to rule out a structure lesion given acute new onset severe headaches. Giant cell arteritis is a consideration although given bilateral symptoms also seems unlikely. Symptoms not entirely consistent with ischemic  etiology.  Will rule out thyroid dysnfunction as well. Will plan to see again in 4 weeks and if no improvement ad negative work-up as below may need to consider neurology referral.   -     CT Head w/o contrast; Future  -     TSH with free T4 reflex; Future  -     Basic metabolic panel; Future  -     Erythrocyte sedimentation rate; Future  -     CRP inflammation; Future    Hyperlipidemia LDL goal <100  -     Lipid panel reflex to direct LDL Fasting; Future     Pt should return to clinic for f/u with me in 4 weeks.     Ramesh Ellison MD  Sep 3, 2019    Pt was seen and plan of care discussed with Dr. Xiao.     Attending Addendum:  Patient seen and examined with resident in clinic today.  Pertinent portions of history and exam were independently verified by myself.  I agree with the exam and plan as outlined above with the following modifications: none.  Ester Xiao MD  Internal Medicine

## 2019-09-06 ENCOUNTER — ANCILLARY PROCEDURE (OUTPATIENT)
Dept: CT IMAGING | Facility: CLINIC | Age: 54
End: 2019-09-06
Attending: INTERNAL MEDICINE
Payer: COMMERCIAL

## 2019-09-06 DIAGNOSIS — R51.9 NONINTRACTABLE EPISODIC HEADACHE, UNSPECIFIED HEADACHE TYPE: ICD-10-CM

## 2019-11-03 ENCOUNTER — HEALTH MAINTENANCE LETTER (OUTPATIENT)
Age: 54
End: 2019-11-03

## 2020-02-10 ENCOUNTER — HEALTH MAINTENANCE LETTER (OUTPATIENT)
Age: 55
End: 2020-02-10

## 2020-02-20 ENCOUNTER — OFFICE VISIT (OUTPATIENT)
Dept: FAMILY MEDICINE | Facility: CLINIC | Age: 55
End: 2020-02-20
Payer: COMMERCIAL

## 2020-02-20 VITALS
HEART RATE: 63 BPM | WEIGHT: 182 LBS | RESPIRATION RATE: 16 BRPM | OXYGEN SATURATION: 99 % | HEIGHT: 68 IN | DIASTOLIC BLOOD PRESSURE: 86 MMHG | SYSTOLIC BLOOD PRESSURE: 119 MMHG | BODY MASS INDEX: 27.58 KG/M2 | TEMPERATURE: 98.2 F

## 2020-02-20 DIAGNOSIS — R35.0 URINARY FREQUENCY: ICD-10-CM

## 2020-02-20 DIAGNOSIS — N40.1 BENIGN PROSTATIC HYPERPLASIA WITH NOCTURIA: ICD-10-CM

## 2020-02-20 DIAGNOSIS — R35.1 BENIGN PROSTATIC HYPERPLASIA WITH NOCTURIA: ICD-10-CM

## 2020-02-20 DIAGNOSIS — Z13.220 SCREENING CHOLESTEROL LEVEL: ICD-10-CM

## 2020-02-20 DIAGNOSIS — Z00.00 ROUTINE GENERAL MEDICAL EXAMINATION AT A HEALTH CARE FACILITY: Primary | ICD-10-CM

## 2020-02-20 DIAGNOSIS — R97.20 ELEVATED PROSTATE SPECIFIC ANTIGEN (PSA): ICD-10-CM

## 2020-02-20 LAB
ALBUMIN UR-MCNC: NEGATIVE MG/DL
ANION GAP SERPL CALCULATED.3IONS-SCNC: 6 MMOL/L (ref 3–14)
APPEARANCE UR: CLEAR
BILIRUB UR QL STRIP: NEGATIVE
BUN SERPL-MCNC: 16 MG/DL (ref 7–30)
CALCIUM SERPL-MCNC: 8.9 MG/DL (ref 8.5–10.1)
CHLORIDE SERPL-SCNC: 106 MMOL/L (ref 94–109)
CHOLEST SERPL-MCNC: 247 MG/DL
CO2 SERPL-SCNC: 27 MMOL/L (ref 20–32)
COLOR UR AUTO: YELLOW
CREAT SERPL-MCNC: 0.81 MG/DL (ref 0.66–1.25)
GFR SERPL CREATININE-BSD FRML MDRD: >90 ML/MIN/{1.73_M2}
GLUCOSE SERPL-MCNC: 100 MG/DL (ref 70–99)
GLUCOSE UR STRIP-MCNC: NEGATIVE MG/DL
HDLC SERPL-MCNC: 55 MG/DL
HGB UR QL STRIP: NEGATIVE
KETONES UR STRIP-MCNC: NEGATIVE MG/DL
LDLC SERPL CALC-MCNC: 172 MG/DL
LEUKOCYTE ESTERASE UR QL STRIP: NEGATIVE
NITRATE UR QL: NEGATIVE
NONHDLC SERPL-MCNC: 192 MG/DL
PH UR STRIP: 7 PH (ref 5–7)
POTASSIUM SERPL-SCNC: 4.6 MMOL/L (ref 3.4–5.3)
PSA SERPL-MCNC: 9.73 UG/L (ref 0–4)
SODIUM SERPL-SCNC: 139 MMOL/L (ref 133–144)
SOURCE: NORMAL
SP GR UR STRIP: 1.02 (ref 1–1.03)
TRIGL SERPL-MCNC: 98 MG/DL
UROBILINOGEN UR STRIP-ACNC: 0.2 EU/DL (ref 0.2–1)

## 2020-02-20 PROCEDURE — 99396 PREV VISIT EST AGE 40-64: CPT | Performed by: PHYSICIAN ASSISTANT

## 2020-02-20 PROCEDURE — 81003 URINALYSIS AUTO W/O SCOPE: CPT | Performed by: PHYSICIAN ASSISTANT

## 2020-02-20 PROCEDURE — 99213 OFFICE O/P EST LOW 20 MIN: CPT | Mod: 25 | Performed by: PHYSICIAN ASSISTANT

## 2020-02-20 PROCEDURE — 84153 ASSAY OF PSA TOTAL: CPT | Performed by: PHYSICIAN ASSISTANT

## 2020-02-20 PROCEDURE — 80048 BASIC METABOLIC PNL TOTAL CA: CPT | Performed by: PHYSICIAN ASSISTANT

## 2020-02-20 PROCEDURE — 36415 COLL VENOUS BLD VENIPUNCTURE: CPT | Performed by: PHYSICIAN ASSISTANT

## 2020-02-20 PROCEDURE — 80061 LIPID PANEL: CPT | Performed by: PHYSICIAN ASSISTANT

## 2020-02-20 ASSESSMENT — MIFFLIN-ST. JEOR: SCORE: 1645.56

## 2020-02-20 NOTE — PROGRESS NOTES
3  SUBJECTIVE:   CC: Chaitanya Ovalles is an 54 year old male who presents for preventive health visit.     Healthy Habits:  Do you get at least three servings of calcium containing foods daily (dairy, green leafy vegetables, etc.)? 2 times  Amount of exercise or daily activities, outside of work: 5 day(s) per week, 45 minutes  Problems taking medications regularly not applicable  Medication side effects: No  Have you had an eye exam in the past two years? no  Do you see a dentist twice per year? yes  Do you have sleep apnea, excessive snoring or daytime drowsiness?yes  Concerns re: prostate health. Turp 3 yrs ago. Now noting irritative/obst voiding symptoms. History of elevated psa. No fhx of prostate cancer. No back pain . No fevers. No weight loss.   Today's PHQ-2 Score:   PHQ-2 ( 1999 [employer]) 2/20/2020 2/8/2019   Q1: Little interest or pleasure in doing things 0 0   Q2: Feeling down, depressed or hopeless 0 0   PHQ-2 Score 0 0   Q1: Little interest or pleasure in doing things - Not at all   Q2: Feeling down, depressed or hopeless - Not at all   PHQ-2 Score - 0       Abuse: Current or Past(Physical, Sexual or Emotional)- No  Do you feel safe in your environment? Yes        Social History     Tobacco Use     Smoking status: Never Smoker     Smokeless tobacco: Never Used   Substance Use Topics     Alcohol use: Yes     Comment: wine on weekends     If you drink alcohol do you typically have >3 drinks per day or >7 drinks per week? Not Applicable                      Last PSA:   PSA   Date Value Ref Range Status   08/06/2019 10.10 (H) 0 - 4 ug/L Final     Comment:     Assay Method:  Chemiluminescence using Siemens Vista analyzer       Reviewed orders with patient. Reviewed health maintenance and updated orders accordingly - Yes  Lab work is in process  Labs reviewed in EPIC  BP Readings from Last 3 Encounters:   02/20/20 119/86   09/03/19 124/77   08/25/19 120/70    Wt Readings from Last 3 Encounters:   02/20/20 82.6  kg (182 lb)   09/03/19 80 kg (176 lb 6.4 oz)   08/25/19 80.7 kg (178 lb)                  Patient Active Problem List   Diagnosis     Erectile dysfunction     Hyperlipidemia     HYPERLIPIDEMIA LDL GOAL <130     Enlarged prostate     Past Surgical History:   Procedure Laterality Date     COLONOSCOPY N/A 12/13/2017    Procedure: COLONOSCOPY;  Colon;  Surgeon: Latonya Carias MD;  [facility]     CYSTOSCOPY, TRANSURETHRAL RESECTION (TUR) PROSTATE, COMBINED N/A 9/18/2017    Procedure: COMBINED CYSTOSCOPY, TRANSURETHRAL RESECTION (TUR) PROSTATE;  Cystoscopy, Transurethral Resection of the Prostate;  Surgeon: Nadine Mena MD;  Location: [facility] OR     NO HISTORY OF SURGERY         Social History     Tobacco Use     Smoking status: Never Smoker     Smokeless tobacco: Never Used   Substance Use Topics     Alcohol use: Yes     Comment: wine on weekends     Family History   Problem Relation Age of Onset     Thyroid Disease Sister          Current Outpatient Medications   Medication Sig Dispense Refill     sildenafil (REVATIO) 20 MG tablet Take 1 tablet (20 mg) by mouth daily (Patient not taking: Reported on 2/20/2020) 30 tablet 2     No Known Allergies  Recent Labs   Lab Test 09/03/19  1619 11/08/18  1543 09/19/17  0654   * 139*  --    HDL 54 48  --    TRIG 157* 319*  --    CR 0.88  --  0.97   GFRESTIMATED >90  --  82   GFRESTBLACK >90  --  >90   POTASSIUM 4.2  --  4.0   TSH 1.76  --   --         Reviewed and updated as needed this visit by clinical staff  Tobacco  Allergies  Meds  Fam Hx  Soc Hx        Reviewed and updated as needed this visit by Provider        Past Medical History:   Diagnosis Date     BPH (benign prostatic hypertrophy) with urinary obstruction      Elevated PSA       Past Surgical History:   Procedure Laterality Date     COLONOSCOPY N/A 12/13/2017    Procedure: COLONOSCOPY;  Colon;  Surgeon: Latonya Carias MD;  [facility]     CYSTOSCOPY, TRANSURETHRAL RESECTION (TUR)  "PROSTATE, COMBINED N/A 9/18/2017    Procedure: COMBINED CYSTOSCOPY, TRANSURETHRAL RESECTION (TUR) PROSTATE;  Cystoscopy, Transurethral Resection of the Prostate;  Surgeon: Nadine Mena MD;  Location: [facility]     NO HISTORY OF SURGERY         ROS:  CONSTITUTIONAL: NEGATIVE for fever, chills, change in weight  INTEGUMENTARY/SKIN: NEGATIVE for worrisome rashes, moles or lesions  EYES: NEGATIVE for vision changes or irritation  ENT: NEGATIVE for ear, mouth and throat problems  RESP: NEGATIVE for significant cough or SOB  CV: NEGATIVE for chest pain, palpitations or peripheral edema  GI: NEGATIVE for nausea, abdominal pain, heartburn, or change in bowel habits   male: negative for dysuria, hematuria, decreased urinary stream, erectile dysfunction, urethral discharge  MUSCULOSKELETAL: NEGATIVE for significant arthralgias or myalgia  NEURO: NEGATIVE for weakness, dizziness or paresthesias  PSYCHIATRIC: NEGATIVE for changes in mood or affect    OBJECTIVE:   /86   Pulse 63   Temp 98.2  F (36.8  C) (Tympanic)   Resp 16   Ht 1.72 m (5' 7.72\")   Wt 82.6 kg (182 lb)   SpO2 99%   BMI 27.90 kg/m    EXAM:  GENERAL: healthy, alert and no distress  EYES: Eyes grossly normal to inspection, PERRL and conjunctivae and sclerae normal  HENT: ear canals and TM's normal, nose and mouth without ulcers or lesions  NECK: no adenopathy, no asymmetry, masses, or scars and thyroid normal to palpation  RESP: lungs clear to auscultation - no rales, rhonchi or wheezes  CV: regular rate and rhythm, normal S1 S2, no S3 or S4, no murmur, click or rub, no peripheral edema and peripheral pulses strong  ABDOMEN: soft, nontender, no hepatosplenomegaly, no masses and bowel sounds normal  MS: no gross musculoskeletal defects noted, no edema  SKIN: no suspicious lesions or rashes  NEURO: Normal strength and tone, mentation intact and speech normal  PSYCH: mentation appears normal, affect normal/bright      ASSESSMENT/PLAN:       " "ICD-10-CM    1. Routine general medical examination at a health care facility Z00.00    2. Benign prostatic hyperplasia with nocturia N40.1 Basic metabolic panel  (Ca, Cl, CO2, Creat, Gluc, K, Na, BUN)    R35.1 UROLOGY ADULT REFERRAL     CANCELED: PSA, screen   3. Elevated prostate specific antigen (PSA) R97.20 PSA, tumor marker     UROLOGY ADULT REFERRAL   4. Urinary frequency R35.0 *UA reflex to Microscopic and Culture ([place] [facility] (except [place] and [place])   5. Screening cholesterol level Z13.220 Lipid panel reflex to direct LDL Fasting       COUNSELING:  Reviewed preventive health counseling, as reflected in patient instructions       Regular exercise       Healthy diet/nutrition    Estimated body mass index is 27.9 kg/m  as calculated from the following:    Height as of this encounter: 1.72 m (5' 7.72\").    Weight as of this encounter: 82.6 kg (182 lb).         reports that he has never smoked. He has never used smokeless tobacco.      Counseling Resources:  ATP IV Guidelines  Pooled Cohorts Equation Calculator  FRAX Risk Assessment  ICSI Preventive Guidelines  Dietary Guidelines for Americans, 2010  [employer]'s MyPlate  ASA Prophylaxis  Lung CA Screening    Rex Butler PA-C  [facility]  "

## 2020-04-24 ENCOUNTER — PRE VISIT (OUTPATIENT)
Dept: UROLOGY | Facility: CLINIC | Age: 55
End: 2020-04-24

## 2020-04-24 ENCOUNTER — TELEPHONE (OUTPATIENT)
Dept: UROLOGY | Facility: CLINIC | Age: 55
End: 2020-04-24

## 2020-04-24 DIAGNOSIS — R97.20 ELEVATED PROSTATE SPECIFIC ANTIGEN (PSA): Primary | ICD-10-CM

## 2020-04-24 DIAGNOSIS — R35.0 URINARY FREQUENCY: ICD-10-CM

## 2020-04-24 NOTE — TELEPHONE ENCOUNTER
Chief Complaint : Return Virtual    Hx/Sx: PSA Check    Records/Orders: PSA Requested    Pt Contacted: Called 04/24/20 at 10:01 AM and pt opted to reschedule. Message sent to scheduling team about PSA and apt.    At Rooming: Check in

## 2020-04-24 NOTE — TELEPHONE ENCOUNTER
----- Message from JANNA Frank sent at 4/24/2020 10:20 AM CDT -----  Regarding: Reschedule  Chaitanya Aguirre would like to reschedule his apt with trina next Friday.  He can follow up June/July with a PSA Lab apt a few days before his visit with Trina!.    Joseph Cardona, EMT

## 2020-07-09 ENCOUNTER — PRE VISIT (OUTPATIENT)
Dept: UROLOGY | Facility: CLINIC | Age: 55
End: 2020-07-09

## 2020-07-09 NOTE — TELEPHONE ENCOUNTER
Visit Type : Virtual-Return    Hx/Sx: Elevated PSA/Frequency    Records/Orders: PSA Requested     Pt Contacted: n/a    At [phone]   [email or web address]

## 2020-07-21 ENCOUNTER — PRE VISIT (OUTPATIENT)
Dept: UROLOGY | Facility: CLINIC | Age: 55
End: 2020-07-21

## 2020-07-21 NOTE — TELEPHONE ENCOUNTER
Visit Type : PSA check     Records/Orders: PSA before appointment     Pt Contacted: no    At Rooming: normal

## 2020-08-28 ENCOUNTER — OFFICE VISIT (OUTPATIENT)
Dept: FAMILY MEDICINE | Facility: CLINIC | Age: 55
End: 2020-08-28
Payer: COMMERCIAL

## 2020-08-28 VITALS
HEART RATE: 64 BPM | TEMPERATURE: 98.4 F | OXYGEN SATURATION: 100 % | WEIGHT: 185 LBS | BODY MASS INDEX: 28.36 KG/M2 | DIASTOLIC BLOOD PRESSURE: 68 MMHG | SYSTOLIC BLOOD PRESSURE: 133 MMHG

## 2020-08-28 DIAGNOSIS — R97.20 ELEVATED PROSTATE SPECIFIC ANTIGEN (PSA): ICD-10-CM

## 2020-08-28 DIAGNOSIS — R31.0 GROSS HEMATURIA: Primary | ICD-10-CM

## 2020-08-28 DIAGNOSIS — R35.0 URINARY FREQUENCY: ICD-10-CM

## 2020-08-28 LAB
ALBUMIN UR-MCNC: NEGATIVE MG/DL
APPEARANCE UR: CLEAR
BACTERIA #/AREA URNS HPF: ABNORMAL /HPF
BILIRUB UR QL STRIP: NEGATIVE
COLOR UR AUTO: YELLOW
GLUCOSE UR STRIP-MCNC: NEGATIVE MG/DL
HGB UR QL STRIP: ABNORMAL
KETONES UR STRIP-MCNC: NEGATIVE MG/DL
LEUKOCYTE ESTERASE UR QL STRIP: NEGATIVE
MUCOUS THREADS #/AREA URNS LPF: PRESENT /LPF
NITRATE UR QL: NEGATIVE
NON-SQ EPI CELLS #/AREA URNS LPF: ABNORMAL /LPF
PH UR STRIP: 6.5 PH (ref 5–7)
PSA SERPL-MCNC: 10.8 UG/L (ref 0–4)
RBC #/AREA URNS AUTO: ABNORMAL /HPF
SOURCE: ABNORMAL
SP GR UR STRIP: 1.01 (ref 1–1.03)
UROBILINOGEN UR STRIP-ACNC: 0.2 EU/DL (ref 0.2–1)
WBC #/AREA URNS AUTO: ABNORMAL /HPF

## 2020-08-28 PROCEDURE — 99213 OFFICE O/P EST LOW 20 MIN: CPT | Performed by: FAMILY MEDICINE

## 2020-08-28 PROCEDURE — 84153 ASSAY OF PSA TOTAL: CPT | Performed by: UROLOGY

## 2020-08-28 PROCEDURE — 36415 COLL VENOUS BLD VENIPUNCTURE: CPT | Performed by: UROLOGY

## 2020-08-28 PROCEDURE — 81001 URINALYSIS AUTO W/SCOPE: CPT | Performed by: FAMILY MEDICINE

## 2020-08-28 PROCEDURE — 87086 URINE CULTURE/COLONY COUNT: CPT | Performed by: FAMILY MEDICINE

## 2020-08-28 NOTE — PROGRESS NOTES
Subjective     Chaitanya Ovalles is a 54 year old male who presents to clinic today for the following health issues:    HPI       Genitourinary - Male  Onset/Duration: 5 days, has a tingling sensation in penis.  Description:   Dysuria (painful urination): YES  Hematuria (blood in urine): YES  Frequency: YES  Waking at night to urinate: YES  Hesitancy (delay in urine): YES  Retention (unable to empty): YES  Decrease in urinary flow: YES  Incontinence: no  Progression of Symptoms:  same  Accompanying Signs & Symptoms:  Fever: no  Back/Flank pain: no  Urethral discharge: no  Testicle lumps/masses/pain: no  Nausea and/or vomiting: no  Abdominal pain: no  History:   History of frequent UTI s: no  History of kidney stones: no  History of hernias: no  Personal or Family history of Prostate problems: no  Sexually active: YES  Precipitating or alleviating factors: None  Therapies tried and outcome: none    He has a known history of BPH and elevated PSA.  He had a TURP 3 years ago.  Urinary symptoms improved after that, but now he has having increased urinary frequency again.  In the last 4 days he has had raz-colored urine consistent with gross hematuria.  No penile discharge.  He is  and monogamous and has no concerns about sexually transmitted infections.  He is overdue for a PSA and urology follow-up.  See chart for details on that.    Current Outpatient Medications   Medication     sildenafil (REVATIO) 20 MG tablet     No current facility-administered medications for this visit.          Review of Systems   Constitutional, HEENT, cardiovascular, pulmonary, gi and gu systems are negative, except as otherwise noted.      Objective    /68 (BP Location: Right arm, Patient Position: Sitting, Cuff Size: Adult Regular)   Pulse 64   Temp 98.4  F (36.9  C) (Oral)   Wt 83.9 kg (185 lb)   SpO2 100%   BMI 28.36 kg/m    Body mass index is 28.36 kg/m .  Physical Exam   GENERAL: healthy, alert and no distress    Office  Visit on 08/28/2020   Component Date Value Ref Range Status     Color Urine 08/28/2020 Yellow   Final     Appearance Urine 08/28/2020 Clear   Final     Glucose Urine 08/28/2020 Negative  NEG^Negative mg/dL Final     Bilirubin Urine 08/28/2020 Negative  NEG^Negative Final     Ketones Urine 08/28/2020 Negative  NEG^Negative mg/dL Final     Specific Gravity Urine 08/28/2020 1.015  1.003 - 1.035 Final     Blood Urine 08/28/2020 Large* NEG^Negative Final     pH Urine 08/28/2020 6.5  5.0 - 7.0 pH Final     Protein Albumin Urine 08/28/2020 Negative  NEG^Negative mg/dL Final     Urobilinogen Urine 08/28/2020 0.2  0.2 - 1.0 EU/dL Final     Nitrite Urine 08/28/2020 Negative  NEG^Negative Final     Leukocyte Esterase Urine 08/28/2020 Negative  NEG^Negative Final     Source 08/28/2020 Midstream Urine   Final     WBC Urine 08/28/2020 0 - 5  OTO5^0 - 5 /HPF Final     RBC Urine 08/28/2020 10-25* OTO2^O - 2 /HPF Final     Squamous Epithelial /LPF Urine 08/28/2020 Few  FEW^Few /LPF Final     Bacteria Urine 08/28/2020 Few* NEG^Negative /HPF Final     Mucous Urine 08/28/2020 Present* NEG^Negative /LPF Final             Assessment & Plan       ICD-10-CM    1. Gross hematuria  R31.0 UA reflex to Microscopic and Culture     Urine Microscopic     Urine Culture Aerobic Bacterial   2. Urinary frequency  R35.0 PSA tumor marker   3. Elevated prostate specific antigen (PSA)  R97.20 PSA tumor marker     His urinalysis shows red blood cells, as expected, but no obvious infection  I will send off a urine culture to make sure that he does not have a UTI, but I suspect his hematuria is related to his prostate situation  We will check the PSA that was previously ordered by urology and then he will call to make the urology appointment as previously recommended from a few months ago    Return for with urology as discussed.    Hank Shah MD  [facility]

## 2020-08-29 LAB
BACTERIA SPEC CULT: NO GROWTH
Lab: NORMAL
SPECIMEN SOURCE: NORMAL

## 2020-08-31 NOTE — RESULT ENCOUNTER NOTE
Chaitanya,  Your urine culture showed no infection within the urine.  Following up with urology for your blood in the urine would be appropriate.    Hank Shah MD

## 2020-09-18 ENCOUNTER — TELEPHONE (OUTPATIENT)
Dept: FAMILY MEDICINE | Facility: CLINIC | Age: 55
End: 2020-09-18

## 2020-09-18 NOTE — TELEPHONE ENCOUNTER
Routing to PCP - patient requesting PSA labs from 8/28/20 be released to [place]. RN sees not released yet.     Romy Butler, RN, BSN, PHN  [facility]: [place]

## 2020-09-18 NOTE — TELEPHONE ENCOUNTER
RN called patient and relayed provider's message. Patient verbalized understanding.     Romy Butler RN, BSN, PHN  [facility]: [place]

## 2020-09-18 NOTE — TELEPHONE ENCOUNTER
The test results went to the ordering provider, which is his urologist.  I do not know that I can release the results to him, because I did not order them and they did not come to me.  He should follow-up with his urologist regarding this.

## 2020-09-18 NOTE — TELEPHONE ENCOUNTER
Reason for Call:  Other     Detailed comments: Patient requesting for nurse tp call regarding why his results for PSA lab is not showing on [facility], Patient stated would need results for his urologist. Please advise.     Phone Number Patient can be reached at: Home number on file [phone] (home)    Best Time: Anytime     Can we leave a detailed message on this number? YES    Call taken on 9/18/2020 at 1:23 PM by Conchis Ivory

## 2020-09-22 ENCOUNTER — PRE VISIT (OUTPATIENT)
Dept: UROLOGY | Facility: CLINIC | Age: 55
End: 2020-09-22

## 2020-09-22 NOTE — TELEPHONE ENCOUNTER
Visit Type : Hematuria    Records/Orders:NA    Pt Contacted: no    At Rooming: urine maybe cysto

## 2020-09-24 ENCOUNTER — OFFICE VISIT (OUTPATIENT)
Dept: UROLOGY | Facility: CLINIC | Age: 55
End: 2020-09-24
Payer: COMMERCIAL

## 2020-09-24 VITALS — SYSTOLIC BLOOD PRESSURE: 142 MMHG | DIASTOLIC BLOOD PRESSURE: 85 MMHG | HEART RATE: 65 BPM

## 2020-09-24 DIAGNOSIS — N40.1 BPH WITH URINARY OBSTRUCTION: ICD-10-CM

## 2020-09-24 DIAGNOSIS — N13.8 BPH WITH URINARY OBSTRUCTION: ICD-10-CM

## 2020-09-24 DIAGNOSIS — R97.20 ELEVATED PROSTATE SPECIFIC ANTIGEN (PSA): ICD-10-CM

## 2020-09-24 DIAGNOSIS — R31.0 GROSS HEMATURIA: Primary | ICD-10-CM

## 2020-09-24 RX ORDER — FINASTERIDE 5 MG/1
5 TABLET, FILM COATED ORAL DAILY
Qty: 90 TABLET | Refills: 3 | Status: SHIPPED | OUTPATIENT
Start: 2020-09-24 | End: 2021-05-03

## 2020-09-24 ASSESSMENT — PAIN SCALES - GENERAL: PAINLEVEL: NO PAIN (0)

## 2020-09-24 NOTE — PATIENT INSTRUCTIONS
Please follow up in 6 month with PSA before   Please get imaging     It was a pleasure meeting with you today.  Thank you for allowing me and my team the privilege of caring for you today.  YOU are the reason we are here, and I truly hope we provided you with the excellent service you deserve.  Please let us know if there is anything else we can do for you so that we can be sure you are leaving completely satisfied with your care experience.

## 2020-09-24 NOTE — PROGRESS NOTES
CHIEF COMPLAINT   It was my pleasure to see Chaitanya Ovalles who is a 54 year old male for follow-up of elevated PSA.      HPI   Chaitanya Ovalles is a very pleasant 54 year old male who presents with a history of gross hematuria and elevated PSA. PSA 6.8 in 2014 with negative prostate biopsy at that time. In 2017, had prostate biopsy with PIRADS 3 lesion at that time, NEM.  Had TURP 2017 with improvement in stream.  MRI 2019 PIRADS 2. Most recent PSA 10.80.    Gross hematuria noted a couple of weeks ago. Having worsening of his urinary stream lately.     TURP 9/18/2017  FINAL DIAGNOSIS:   Prostate, chips, transurethral resection:   - Benign prostatic hyperplasia     TRUS biopsy 6/28/2017 - No malignancy    PSA  8/28/2020 - 10.80  2/20/2020 - 9.73  8/6/2019 - 10.10  11/8/2018 - 7.19    MRI Prostate 8/6/2019  IMPRESSION:  1. Based on the most suspicious abnormality, this exam is  characterized as PIRADS 2 - Clinically significant cancer is unlikely  to be present.   2. No suspicious adenopathy or evidence of pelvic metastases.  3. Urinary bladder wall thickening in the setting of BPH changes,  likely represents sequela of obstructive uropathy.    PHYSICAL EXAM  Patient is a 54 year old  male   Vitals: Blood pressure (!) 142/85, pulse 65.  General Appearance Adult: There is no height or weight on file to calculate BMI.  Alert, no acute distress, oriented  HENT: throat/mouth:normal, good dentition  Lungs: no respiratory distress, or pursed lip breathing  Heart: No obvious jugular venous distension present  Abdomen: soft, nontender, no organomegaly or masses  Back: no CVAT  Musculoskeltal: extremities normal, no peripheral edema  Skin: no suspicious lesions or rashes  Neuro: Alert, oriented, speech and mentation normal  Psych: affect and mood normal  Gait: Normal  : penis, scrotum, testes normal    ASSESSMENT and PLAN  54 year old male with history of gross hematuria, BPH, and elevated PSA. Regarding gross hematuria,  will have cytology and CT urogram completed. Office cystoscopy notable for severe BPH with trilobar hypertrophy, despite TURP in 2017. Patient does have some symptoms from this. Interested in finasteride. Discussed risks and potential side effects of this medication. There were no suspicious lesions on cystoscopy, and presumption is that this is related to his significant BPH with hypervascularity.    Regarding his PSA, while it remains rather high, he has had recent biopsy, has large prostate with normal MRI, and PSA has been stable. Previous negative biopsy. As such, his preference is to continue to observe, which is reasonable. Will recheck in 6 months.    - Prescribe finasteride 5 mg daily  - Office cystoscopy today - no suspicious lesions  - Cytology today  - CT urogram - will call with results  - Follow-up 6 months with PSA    I spent over 15 minutes with the patient in addition to cystoscopy.  Over half this time was spent on counseling regarding gross hematuria. Elevated PSA and BPH with urinary obstruction.    Ace Hickman MD  Urology  [facility]

## 2020-09-25 LAB — COPATH REPORT: NORMAL

## 2020-09-26 NOTE — PROCEDURES
OFFICE CYSTOSCOPY 9/24/2020    Pre-procedure diagnosis:  Gross Hematuria  Post-procedure diagnosis: Severe BPH; no suspicious lesions  Procedure performed:  Cystourethroscopy  Surgeon:    Ace Hickman MD  Anesthesia:    Local    Description of procedure:   After fully informed, voluntary consent was obtained, the patient was brought into the procedure room, identified and placed in a supine position on the cystoscopy table.  The groin/scrotum were prepped with betadine and draped in a sterile fashion. Urojet lidocaine gel was introduced.  A 15F flexible cystoscope was inserted into the urethra, and the bladder and urethra were examined in a systematic manner.  The patient tolerated the procedure well and there were no complications.      Cystoscopic findings:  The urethra was normal without strictures.  The prostate was 5cm long and demonstrated severe bilobar hypertrophy. Very large right lateral lobe. Modest TURP defect noted, but still with severe trilobar hypertrophy.  There was a large median lobe.  The external sphincter coapted well and the bladder neck was obstructed. The bladder was completely surveyed.  There was moderate trabeculation.  There were no neoplasms, stones, or diverticula identifed.  The ureteric orifices were normal in position and number and effluxing clear urine.    Assessment/Plan:   Chaitanya Ovalles is a 54 year old male with a history of gross hematuria. Noted today to have severe ongoing BPH. See progress note from today for complete discussion.    Ace Hickman MD  Urology  [facility]

## 2020-09-30 ENCOUNTER — ANCILLARY PROCEDURE (OUTPATIENT)
Dept: CT IMAGING | Facility: CLINIC | Age: 55
End: 2020-09-30
Attending: UROLOGY
Payer: COMMERCIAL

## 2020-09-30 ENCOUNTER — TELEPHONE (OUTPATIENT)
Dept: UROLOGY | Facility: CLINIC | Age: 55
End: 2020-09-30

## 2020-09-30 DIAGNOSIS — N52.9 ERECTILE DYSFUNCTION, UNSPECIFIED ERECTILE DYSFUNCTION TYPE: Primary | ICD-10-CM

## 2020-09-30 DIAGNOSIS — R31.0 GROSS HEMATURIA: ICD-10-CM

## 2020-09-30 LAB
CREAT BLD-MCNC: 1 MG/DL (ref 0.66–1.25)
GFR SERPL CREATININE-BSD FRML MDRD: 78 ML/MIN/{1.73_M2}

## 2020-09-30 RX ORDER — IOPAMIDOL 755 MG/ML
114 INJECTION, SOLUTION INTRAVASCULAR ONCE
Status: COMPLETED | OUTPATIENT
Start: 2020-09-30 | End: 2020-09-30

## 2020-09-30 RX ADMIN — IOPAMIDOL 114 ML: 755 INJECTION, SOLUTION INTRAVASCULAR at 17:25

## 2020-09-30 NOTE — TELEPHONE ENCOUNTER
Hi Dr. Hickman,    Patient is requesting for a prescription of sildenafil citrate oral tablet 20 mg -take one by mouth once daily. He would like this medication sent [facility] in [place] at [phone]. Is this ok? Please advise.    Thanks, Marcelo López MA

## 2020-10-05 DIAGNOSIS — N52.9 ERECTILE DYSFUNCTION, UNSPECIFIED ERECTILE DYSFUNCTION TYPE: ICD-10-CM

## 2020-10-06 ENCOUNTER — TELEPHONE (OUTPATIENT)
Dept: UROLOGY | Facility: CLINIC | Age: 55
End: 2020-10-06

## 2020-10-06 RX ORDER — SILDENAFIL CITRATE 20 MG/1
20 TABLET ORAL DAILY
Qty: 30 TABLET | Refills: 11 | Status: SHIPPED | OUTPATIENT
Start: 2020-10-06 | End: 2021-04-16

## 2020-10-06 RX ORDER — SILDENAFIL CITRATE 20 MG/1
20 TABLET ORAL DAILY
Qty: 30 TABLET | Refills: 2 | Status: SHIPPED | OUTPATIENT
Start: 2020-10-06 | End: 2021-11-23

## 2020-10-06 NOTE — TELEPHONE ENCOUNTER
[facility] Call [facility]    Phone Message    May a detailed message be left on voicemail: yes     Reason for Call: Requesting Results   Name/type of test: UA  Date of test: 8/28  Was test done at a location other than [facility] (Please fill in the location if not [facility])?: No    He received the results and unsure what they mean, because he still has blood in his urine and his very concerned , please call qi thanks      Action Taken: Message routed to:  [facility] ([facility] uro    Travel Screening: Not Applicable

## 2020-10-06 NOTE — TELEPHONE ENCOUNTER
Hi all,    Patient was notified that his cytology was negative.    Patient will like a call to discuss his CT result. He states that he continues to have blood in his urine. Please advise.    Thanks, Marcelo López MA

## 2020-10-06 NOTE — TELEPHONE ENCOUNTER
9/24/2020  [facility]   Ace Hickman MD  Urology     sildenafil (REVATIO) 20 MG tablet

## 2020-11-16 ENCOUNTER — HEALTH MAINTENANCE LETTER (OUTPATIENT)
Age: 55
End: 2020-11-16

## 2021-01-13 ENCOUNTER — OFFICE VISIT (OUTPATIENT)
Dept: FAMILY MEDICINE | Facility: CLINIC | Age: 56
End: 2021-01-13
Payer: COMMERCIAL

## 2021-01-13 VITALS
WEIGHT: 187 LBS | SYSTOLIC BLOOD PRESSURE: 132 MMHG | DIASTOLIC BLOOD PRESSURE: 85 MMHG | TEMPERATURE: 97.9 F | RESPIRATION RATE: 16 BRPM | BODY MASS INDEX: 28.67 KG/M2 | OXYGEN SATURATION: 99 % | HEART RATE: 58 BPM

## 2021-01-13 DIAGNOSIS — R80.9 PROTEINURIA, UNSPECIFIED TYPE: ICD-10-CM

## 2021-01-13 DIAGNOSIS — N40.0 ENLARGED PROSTATE: ICD-10-CM

## 2021-01-13 DIAGNOSIS — R33.9 URINARY RETENTION: ICD-10-CM

## 2021-01-13 DIAGNOSIS — R35.0 URINARY FREQUENCY: Primary | ICD-10-CM

## 2021-01-13 LAB
ALBUMIN UR-MCNC: 30 MG/DL
APPEARANCE UR: CLEAR
BACTERIA #/AREA URNS HPF: ABNORMAL /HPF
BILIRUB UR QL STRIP: NEGATIVE
COLOR UR AUTO: YELLOW
CREAT UR-MCNC: 158 MG/DL
GLUCOSE UR STRIP-MCNC: NEGATIVE MG/DL
HGB UR QL STRIP: NEGATIVE
KETONES UR STRIP-MCNC: NEGATIVE MG/DL
LEUKOCYTE ESTERASE UR QL STRIP: NEGATIVE
MICROALBUMIN UR-MCNC: 133 MG/L
MICROALBUMIN/CREAT UR: 84.18 MG/G CR (ref 0–17)
MUCOUS THREADS #/AREA URNS LPF: PRESENT /LPF
NITRATE UR QL: NEGATIVE
NON-SQ EPI CELLS #/AREA URNS LPF: ABNORMAL /LPF
PH UR STRIP: 6 PH (ref 5–7)
RBC #/AREA URNS AUTO: ABNORMAL /HPF
SOURCE: ABNORMAL
SP GR UR STRIP: 1.02 (ref 1–1.03)
UROBILINOGEN UR STRIP-ACNC: 0.2 EU/DL (ref 0.2–1)
WBC #/AREA URNS AUTO: ABNORMAL /HPF

## 2021-01-13 PROCEDURE — 82043 UR ALBUMIN QUANTITATIVE: CPT | Performed by: PHYSICIAN ASSISTANT

## 2021-01-13 PROCEDURE — 99213 OFFICE O/P EST LOW 20 MIN: CPT | Performed by: PHYSICIAN ASSISTANT

## 2021-01-13 PROCEDURE — 81001 URINALYSIS AUTO W/SCOPE: CPT | Performed by: PHYSICIAN ASSISTANT

## 2021-01-13 NOTE — PROGRESS NOTES
Subjective     Chaitanya is a 55 year old who presents to clinic today for the following health issues     HPI       Genitourinary - Male  Onset/Duration: x3wks  Description:   Dysuria (painful urination): no}  Hematuria (blood in urine): YES  Frequency: YES  Waking at night to urinate: YES  Hesitancy (delay in urine): YES  Retention (unable to empty): YES  Decrease in urinary flow: YES  Incontinence: no  Progression of Symptoms:  worsening and intermittent  Accompanying Signs & Symptoms:  Fever: no  Back/Flank pain: YES  Urethral discharge: no  Testicle lumps/masses/pain: YES- pain  Nausea and/or vomiting: no  Abdominal pain: YES  History:   History of frequent UTI s: YES  History of kidney stones: no  History of hernias: no  Personal or Family history of Prostate problems: no  Sexually active: YES  Precipitating or alleviating factors: None  Therapies tried and outcome: none    Has seen urology for these sxs  Prescribed finasteride 5mg every day  Cystoscopy normal  CT urogram normal - 3. Marked prostatomegaly. Diffuse urinary bladder wall thickening is  likely related to bladder outlet obstruction.  Was told to follow up in 6 months  No changes in bowel habits, denies constipation       Review of Systems   Constitutional, gi and gu systems are negative, except as otherwise noted.      Objective    /85   Pulse 58   Temp 97.9  F (36.6  C) (Tympanic)   Resp 16   Wt 84.8 kg (187 lb)   SpO2 99%   BMI 28.67 kg/m    Body mass index is 28.67 kg/m .  Physical Exam   GENERAL: healthy, alert and no distress  ABDOMEN: tenderness suprapubic, RLQ and LLQ and no CVA tenderness  MS: no gross musculoskeletal defects noted, no edema  SKIN: no suspicious lesions or rashes  NEURO: Normal strength and tone, mentation intact and speech normal  PSYCH: mentation appears normal, affect normal/bright      Assessment & Plan     1. Urinary frequency    2. Proteinuria, unspecified type    3. Enlarged prostate    4. Urinary  "retention        1,2) UA appears neg for infection. Will obtain a microalbumin due to protein on UA.     3,4) Will message his urologist regarding worsening symptoms to see if he should be seen sooner than March.     Review of prior external note(s) from - urology         BMI:   Estimated body mass index is 28.67 kg/m  as calculated from the following:    Height as of 2/20/20: 1.72 m (5' 7.72\").    Weight as of this encounter: 84.8 kg (187 lb).       Return for follow up with urology.    ADOLPH Chase [facility]          "

## 2021-01-13 NOTE — Clinical Note
Chaitanya reports an increase in urinary retention and suprapubic pain in the last month. UA appears neg for infection. Do you want to see him sooner than his routine follow up in March?  [place]

## 2021-02-11 NOTE — PROGRESS NOTES
UROLOGIC DIAGNOSES:       CURRENT INTERVENTIONS:       HISTORY:     Patient presents for elevated PSA and lower urinary tract symptoms.  IN 2014, patient had elevated PSA (6.8) and had a biopsy that was negative.   He was followed by outside urologist. Had cystoscopy that did not show stricture or other significant findings. Had not yet had UDS.      Notes that he continues to have some lower urinary tract symptoms despite taking tamsulosin (8mg, per patient report).     Patient had cystoscopy that showed significant trilobar hypertrophy.   Patient s/p prostate biopsy for PIRADS 3 lesion and elevated PSA. No evidence of prostate cancer on the specimen.     Currently s/p TURP and states that his stream is excellent and that his symptoms have improved. He is states he is pleased with the outcome.     Patient notes some new onset urinary frequency since last visit. No other symptoms noted besides that. Also states that sildenafil was not effective for ED. Upon questioning, he did not take it on an empty stomach.     Had cystoscopy that did not show BNC or other pathology. Some BPH still noted.  Patient notes at this visit that this frequency has resolved. Has not been taking alpha blocker.   Notes that he no longer notes ED and does not use sildenafil.   PSA was 7.19              PAST MEDICAL HISTORY:   Past Medical History:   Diagnosis Date     BPH (benign prostatic hypertrophy) with urinary obstruction      Elevated PSA        PAST SURGICAL HISTORY:   Past Surgical History:   Procedure Laterality Date     COLONOSCOPY N/A 12/13/2017    Procedure: COLONOSCOPY;  Colon;  Surgeon: Latonya Carias MD;  Location:  OR     CYSTOSCOPY, TRANSURETHRAL RESECTION (TUR) PROSTATE, COMBINED N/A 9/18/2017    Procedure: COMBINED CYSTOSCOPY, TRANSURETHRAL RESECTION (TUR) PROSTATE;  Cystoscopy, Transurethral Resection of the Prostate;  Surgeon: Nadine Mena MD;  Location: [facility] OR     NO HISTORY OF SURGERY         FAMILY  done "HISTORY: No family history on file.    SOCIAL HISTORY:   Social History     Tobacco Use     Smoking status: Never Smoker     Smokeless tobacco: Never Used   Substance Use Topics     Alcohol use: Not on file     Comment: social       Current Outpatient Medications   Medication     sildenafil (REVATIO) 20 MG tablet     alfuzosin (UROXATRAL) 10 MG 24 hr tablet     oxyCODONE (ROXICODONE) 5 MG IR tablet     phenazopyridine (PYRIDIUM) 100 MG tablet     No current facility-administered medications for this visit.          PHYSICAL EXAM:    /78   Pulse 62   Ht 1.727 m (5' 8\")   Wt 81.9 kg (180 lb 9.6 oz)   BMI 27.46 kg/m      HEENT: Normocephalic and atraumatic   Cardiac: Not done  Back/Flank: Not done  CNS/PNS: Not done  Respiratory: Normal non-labored breathing  Abdomen: Soft nontender and nondistended  Peripheral Vascular: Not done  Mental Status: Not done    Penis: Not done  Scrotal Skin: Not done  Testicles: Not done  Epididymis: Not done  Digital Rectal Exam:     Cystoscopy: Not done    Imaging: None    Urinalysis: UA RESULTS:  No results for input(s): COLOR, APPEARANCE, URINEGLC, URINEBILI, URINEKETONE, SG, UBLD, URINEPH, PROTEIN, UROBILINOGEN, NITRITE, LEUKEST, RBCU, WBCU in the last 46131 hours.    PSA: 7.19   ml  Q max 11.2 ml/s   Post Void Residual: 28ml    Other labs: None today      IMPRESSION:  52 y/o M with elevated PSA, lower urinary tract symptoms despite medication, significant trilobar hypertrophy with improvement in symptoms post op       PLAN:  MRI prostate   If no change from previous, will have PSA in six months   Uroflow/PVR in six months   Follow up in six months       Total Time: 15 minutes                                     Total in Consultation: greater than 50%     "

## 2021-03-10 DIAGNOSIS — R97.20 ELEVATED PROSTATE SPECIFIC ANTIGEN (PSA): ICD-10-CM

## 2021-03-10 LAB — PSA SERPL-MCNC: 10.3 UG/L (ref 0–4)

## 2021-03-10 PROCEDURE — 84153 ASSAY OF PSA TOTAL: CPT | Performed by: PATHOLOGY

## 2021-03-10 PROCEDURE — 36415 COLL VENOUS BLD VENIPUNCTURE: CPT | Performed by: PATHOLOGY

## 2021-03-16 ENCOUNTER — PRE VISIT (OUTPATIENT)
Dept: UROLOGY | Facility: CLINIC | Age: 56
End: 2021-03-16

## 2021-03-16 NOTE — TELEPHONE ENCOUNTER
Visit Type : PSA check     Records/Orders: PSA in epic     Pt Contacted: no    At Rooming: phone

## 2021-03-28 ENCOUNTER — NURSE TRIAGE (OUTPATIENT)
Dept: NURSING | Facility: CLINIC | Age: 56
End: 2021-03-28

## 2021-03-28 NOTE — TELEPHONE ENCOUNTER
Pain upper inner right groin. Started slowly and has gotten worse past 2 days.    Pt is in [place], [place] at this time.     Gave general information to seek care.    Paloma Landry RN 03/28/21 1:06 PM  [facility] Nurse Advisor    Additional Information    General information question, no triage required and triager able to answer question    Negative: RN needs further essential information from caller in order to complete triage    Negative: Requesting regular office appointment    Negative: [1] Caller requesting NON-URGENT health information AND [2] PCP's office is the best resource    Negative: Health Information question, no triage required and triager able to answer question    Protocols used: INFORMATION ONLY CALL-A-

## 2021-03-29 ENCOUNTER — NURSE TRIAGE (OUTPATIENT)
Dept: NURSING | Facility: CLINIC | Age: 56
End: 2021-03-29

## 2021-03-29 NOTE — TELEPHONE ENCOUNTER
Patient calling in stating he has a sharp pain in the inner right thigh around the groin. Hard to lift his leg. Started 3-4 days ago. NO swelling.  Hard to lift the leg up. Felt a tingling sensation on the calf of the right leg, like his leg is asleep.    Feels tired.   Right foot same color temperature.  Able to walk but stated the pain is very bad.   NO fever  Advised patient to be seen now. Per RN protocol he should be seen in ER or Urgent Care.   Advised to not massage or rub the area.   Lisbeth Robbins RN on 3/29/2021 at 10:30 AM        Additional Information    Negative: Looks like a broken bone or dislocated joint (e.g., crooked or deformed)    Negative: Sounds like a life-threatening emergency to the triager    Negative: Followed a hip injury    Negative: Followed a knee injury    Negative: Followed an ankle or foot injury    Negative: Back pain radiating (shooting) into leg(s)    Negative: Foot pain is the main symptom    Negative: Ankle pain is the main symptom    Negative: Knee pain is the main symptom    Negative: Leg swelling is the main symptom    Negative: Chest pain    Negative: Difficulty breathing    Negative: Entire foot is cool or blue in comparison to other side    Negative: Unable to walk    Negative: Fever and red area (or area very tender to touch)    Negative: Fever and swollen joint    Thigh or calf pain in only one leg and present > 1 hour    Protocols used: LEG PAIN-A-[place]

## 2021-04-01 ENCOUNTER — VIRTUAL VISIT (OUTPATIENT)
Dept: UROLOGY | Facility: CLINIC | Age: 56
End: 2021-04-01

## 2021-04-01 DIAGNOSIS — N40.0 ENLARGED PROSTATE: ICD-10-CM

## 2021-04-01 DIAGNOSIS — R97.20 ELEVATED PROSTATE SPECIFIC ANTIGEN (PSA): Primary | ICD-10-CM

## 2021-04-01 PROCEDURE — 99213 OFFICE O/P EST LOW 20 MIN: CPT | Mod: 95 | Performed by: UROLOGY

## 2021-04-01 ASSESSMENT — PAIN SCALES - GENERAL: PAINLEVEL: SEVERE PAIN (6)

## 2021-04-01 NOTE — PROGRESS NOTES
Chaitanya Ovalles is a 55 year old male who is being evaluated via a billable telephone visit.      What phone number would you like to be contacted at? [phone]  How would you like to obtain your AVS? Lee    Chief Complaint   It was my pleasure to speak with Chaitanya Ovalles who is a 55 year old male for follow-up of elevated PSA      HPI  Chaitanya Ovalles is a very pleasant 55 year old male who presents with a history of gross hematuria and elevated PSA. PSA 6.8 in 2014 with negative prostate biopsy at that time. In 2017, had prostate biopsy with PIRADS 3 lesion at that time, NEM.  Had TURP 2017 with improvement in stream.   MRI 2019 PIRADS 2 with PSA of 10.10 at that time.    Had hematuria and negative cystoscopy 9/2020. Started finasteride, but only took for a short time.     Noting significant weakening of urinary stream and sensation of difficulty emptying.    CT Urogram 9/30/2020   IMPRESSION:   1. Normal CT urogram without any concerning findings within the upper  urinary tract.  2. Lipid rich 12 mm right adrenal adenoma.  3. Marked prostatomegaly. Diffuse urinary bladder wall thickening is  likely related to bladder outlet obstruction.  4. Colonic diverticulosis    TURP 9/18/2017  FINAL DIAGNOSIS:   Prostate, chips, transurethral resection:   - Benign prostatic hyperplasia     PSA  3/10/2021 - 10.30  8/28/2020 - 10.80  2/20/2020 - 9.73  8/6/2019 - 10.10  11/8/2018 - 7.19     MRI Prostate 8/6/2019  Size: 5.0 x 5.4 x 6.2 cm for a volume of 91 grams  IMPRESSION:  1. Based on the most suspicious abnormality, this exam is  characterized as PIRADS 2 - Clinically significant cancer is unlikely  to be present.   2. No suspicious adenopathy or evidence of pelvic metastases.  3. Urinary bladder wall thickening in the setting of BPH changes,  likely represents sequela of obstructive uropathy.    I have reviewed and updated the patient's Past Medical History, Social History, Family History and Medication  List.    Review of Systems   Constitutional, HEENT, cardiovascular, pulmonary, gi and gu systems are negative, except as otherwise noted.    ALLERGIES  Patient has no known allergies.    Vitals:  No vitals were obtained today due to virtual visit.    Physical Exam   healthy, alert and no distress  PSYCH: Alert and oriented times 3; coherent speech, normal   rate and volume, able to articulate logical thoughts, able   to abstract reason, no tangential thoughts, no hallucinations   or delusions  His affect is normal and pleasant  RESP: No cough, no audible wheezing, able to talk in full sentences  Remainder of exam unable to be completed due to telephone visits      Outside and Past Medical records:    Review of the result(s) of each unique test - PSA, CT, MRI, pathology    ASSESSMENT and PLAN  55 year old male with history of BPH, and elevated PSA. Negative gross hematuria workup 9/2020. Office cystoscopy notable for severe BPH with trilobar hypertrophy, despite TURP in 2017. Patient does have symptoms from this and is interested in further discussions about treatment options. He is interested in meeting with Dr. Duran to discuss options for BPH treatment.     Regarding his PSA, while it remains rather high, he has had a previous negative biopsy and PSA has been stable. Has ~100g gland. In anticipation of possible intervention for BPH, will plan another prostate MRI to rule out suspicious lesions.     - MRI prostate  - Referral to Dr. Duran     Phone call duration: 11 minutes    Greater than 20 total minutes spent on the date of the encounter including direct interaction with the patient, performing chart review, documentation and further activities as noted above.    Ace Hickman MD  Urology  [facility]

## 2021-04-02 NOTE — PATIENT INSTRUCTIONS
Please get MRI and follow up with      It was a pleasure meeting with you today.  Thank you for allowing me and my team the privilege of caring for you today.  YOU are the reason we are here, and I truly hope we provided you with the excellent service you deserve.  Please let us know if there is anything else we can do for you so that we can be sure you are leaving completely satisfied with your care experience.

## 2021-04-03 ENCOUNTER — HEALTH MAINTENANCE LETTER (OUTPATIENT)
Age: 56
End: 2021-04-03

## 2021-04-05 ENCOUNTER — TELEPHONE (OUTPATIENT)
Dept: ONCOLOGY | Facility: CLINIC | Age: 56
End: 2021-04-05

## 2021-04-16 ENCOUNTER — OFFICE VISIT (OUTPATIENT)
Dept: FAMILY MEDICINE | Facility: CLINIC | Age: 56
End: 2021-04-16
Payer: COMMERCIAL

## 2021-04-16 VITALS
HEIGHT: 68 IN | RESPIRATION RATE: 14 BRPM | DIASTOLIC BLOOD PRESSURE: 80 MMHG | SYSTOLIC BLOOD PRESSURE: 124 MMHG | BODY MASS INDEX: 28.08 KG/M2 | OXYGEN SATURATION: 99 % | HEART RATE: 57 BPM | WEIGHT: 185.25 LBS | TEMPERATURE: 97.2 F

## 2021-04-16 DIAGNOSIS — R19.7 DIARRHEA, UNSPECIFIED TYPE: ICD-10-CM

## 2021-04-16 DIAGNOSIS — R10.13 ABDOMINAL PAIN, EPIGASTRIC: Primary | ICD-10-CM

## 2021-04-16 DIAGNOSIS — E78.5 HYPERLIPIDEMIA LDL GOAL <100: ICD-10-CM

## 2021-04-16 DIAGNOSIS — E78.5 HYPERLIPIDEMIA LDL GOAL <100: Primary | ICD-10-CM

## 2021-04-16 DIAGNOSIS — R14.2 BELCHING: ICD-10-CM

## 2021-04-16 LAB
ALBUMIN SERPL-MCNC: 4.2 G/DL (ref 3.4–5)
ALP SERPL-CCNC: 75 U/L (ref 40–150)
ALT SERPL W P-5'-P-CCNC: 31 U/L (ref 0–70)
ANION GAP SERPL CALCULATED.3IONS-SCNC: 3 MMOL/L (ref 3–14)
AST SERPL W P-5'-P-CCNC: 16 U/L (ref 0–45)
BILIRUB SERPL-MCNC: 0.4 MG/DL (ref 0.2–1.3)
BUN SERPL-MCNC: 18 MG/DL (ref 7–30)
C DIFF TOX B STL QL: NEGATIVE
CALCIUM SERPL-MCNC: 9.1 MG/DL (ref 8.5–10.1)
CHLORIDE SERPL-SCNC: 107 MMOL/L (ref 94–109)
CHOLEST SERPL-MCNC: 244 MG/DL
CO2 SERPL-SCNC: 28 MMOL/L (ref 20–32)
CREAT SERPL-MCNC: 0.94 MG/DL (ref 0.66–1.25)
ERYTHROCYTE [DISTWIDTH] IN BLOOD BY AUTOMATED COUNT: 13.1 % (ref 10–15)
GFR SERPL CREATININE-BSD FRML MDRD: >90 ML/MIN/{1.73_M2}
GLUCOSE SERPL-MCNC: 101 MG/DL (ref 70–99)
HCT VFR BLD AUTO: 43.6 % (ref 40–53)
HDLC SERPL-MCNC: 48 MG/DL
HGB BLD-MCNC: 14.7 G/DL (ref 13.3–17.7)
LDLC SERPL CALC-MCNC: 176 MG/DL
LIPASE SERPL-CCNC: 186 U/L (ref 73–393)
MCH RBC QN AUTO: 26.3 PG (ref 26.5–33)
MCHC RBC AUTO-ENTMCNC: 33.7 G/DL (ref 31.5–36.5)
MCV RBC AUTO: 78 FL (ref 78–100)
NONHDLC SERPL-MCNC: 196 MG/DL
PLATELET # BLD AUTO: 206 10E9/L (ref 150–450)
POTASSIUM SERPL-SCNC: 4.1 MMOL/L (ref 3.4–5.3)
PROT SERPL-MCNC: 7.6 G/DL (ref 6.8–8.8)
RBC # BLD AUTO: 5.58 10E12/L (ref 4.4–5.9)
SODIUM SERPL-SCNC: 138 MMOL/L (ref 133–144)
SPECIMEN SOURCE: NORMAL
TRIGL SERPL-MCNC: 101 MG/DL
WBC # BLD AUTO: 3.9 10E9/L (ref 4–11)

## 2021-04-16 PROCEDURE — 80061 LIPID PANEL: CPT | Performed by: PHYSICIAN ASSISTANT

## 2021-04-16 PROCEDURE — 83690 ASSAY OF LIPASE: CPT | Performed by: PHYSICIAN ASSISTANT

## 2021-04-16 PROCEDURE — 99214 OFFICE O/P EST MOD 30 MIN: CPT | Performed by: PHYSICIAN ASSISTANT

## 2021-04-16 PROCEDURE — 87506 IADNA-DNA/RNA PROBE TQ 6-11: CPT | Performed by: PHYSICIAN ASSISTANT

## 2021-04-16 PROCEDURE — 85027 COMPLETE CBC AUTOMATED: CPT | Performed by: PHYSICIAN ASSISTANT

## 2021-04-16 PROCEDURE — 87338 HPYLORI STOOL AG IA: CPT | Performed by: PHYSICIAN ASSISTANT

## 2021-04-16 PROCEDURE — 36415 COLL VENOUS BLD VENIPUNCTURE: CPT | Performed by: PHYSICIAN ASSISTANT

## 2021-04-16 PROCEDURE — 80053 COMPREHEN METABOLIC PANEL: CPT | Performed by: PHYSICIAN ASSISTANT

## 2021-04-16 PROCEDURE — 87493 C DIFF AMPLIFIED PROBE: CPT | Performed by: PHYSICIAN ASSISTANT

## 2021-04-16 RX ORDER — ATORVASTATIN CALCIUM 20 MG/1
20 TABLET, FILM COATED ORAL DAILY
Qty: 90 TABLET | Refills: 0 | Status: SHIPPED | OUTPATIENT
Start: 2021-04-16 | End: 2021-06-01

## 2021-04-16 ASSESSMENT — ENCOUNTER SYMPTOMS
ANAL BLEEDING: 0
LIGHT-HEADEDNESS: 0
VOMITING: 0
ABDOMINAL PAIN: 1
DYSURIA: 0
SHORTNESS OF BREATH: 0
NAUSEA: 1
NERVOUS/ANXIOUS: 0
CONSTIPATION: 0
FEVER: 0
HEMATURIA: 0
DIARRHEA: 1
HEMATOCHEZIA: 0

## 2021-04-16 ASSESSMENT — MIFFLIN-ST. JEOR: SCORE: 1655.35

## 2021-04-16 NOTE — PROGRESS NOTES
Assessment & Plan     Abdominal pain, epigastric  Diarrhea, unspecified type  Belching  Patient is a 55-year-old male who presents to clinic due to many months of belching, epigastric discomfort, and bloating as well as a recent onset of generalized abdominal pain with diarrhea.  Vital signs normal.  Physical exam significant for epigastric discomfort to palpation.    -Low suspicion for acute infectious etiology of symptoms given normal vital signs, no rebound or guarding, and tenderness to palpation located to epigastric region specifically.  -Differential diagnosis includes H. pylori, C. difficile, viral/bacterial diarrhea, GERD.  Will evaluate further with labs.  Patient prescribed omeprazole.    - CBC with platelets  - Comprehensive metabolic panel (BMP + Alb, Alk Phos, ALT, AST, Total. Bili, TP)  - Lipase  - Helicobacter pylori Antigen Stool; Future  - Clostridium difficile Toxin B PCR; Future  - Enteric Bacteria and Virus Panel by [facility] Stool; Future  - omeprazole (PRILOSEC) 20 MG DR capsule; Take 1 capsule (20 mg) by mouth daily for 21 days    Hyperlipidemia LDL goal <100  Patient has history of hyperlipidemia.  Lipid panel completed 1 year ago.  Patient is not on medications for management.  Will recheck today.  - Lipid panel reflex to direct LDL Fasting      See Patient Instructions    Return in about 1 week (around 4/23/2021), or if symptoms worsen or fail to improve.    ADOLPH Ramos [facility] LILIYA Tavares is a 55 year old who presents for the following health issues     HPI     Abdominal/Flank Pain  Onset/Duration:Patient notes burping every morning starting 3-4 months ago with a feeling of abdominal bloating and epigastric pain.  Patient notes that 4 days ago he developed watery diarrhea 5 times per day. Afterwards, patient develops severe pain that is generalized.    Patient also notes that he was in [place] when he experienced severe right upper  "leg/groin pain. He was seen in the ED there and was diagnosed with a hernia as well as a muscle strain.   Description:   Character: Sharp pain after BM  Location: epigastric region  Radiation: to inner right thigh  Intensity: moderate  Progression of Symptoms:  worsening  Accompanying Signs & Symptoms:  Fever/Chills: no  Gas/Bloating: YES- Bloating and burping  Nausea: no  Vomitting: no  Diarrhea: YES  Constipation: no  Dysuria or Hematuria: no  History:   Trauma: no  Previous similar pain: no  Previous tests done: none  Precipitating factors:   Does the pain change with:     Food: no    Bowel Movement: YES    Urination: no   Other factors:  no  Therapies tried and outcome: None    Patient would also like to have cholesterol rechecked as it was high 1 year ago.    Review of Systems   Constitutional: Negative for fever.   HENT: Negative for congestion.    Respiratory: Negative for shortness of breath.    Cardiovascular: Negative for chest pain.   Gastrointestinal: Positive for abdominal pain, diarrhea and nausea. Negative for anal bleeding, constipation, hematochezia and vomiting.   Genitourinary: Negative for dysuria and hematuria.   Skin: Negative for rash.   Neurological: Negative for light-headedness.   Psychiatric/Behavioral: The patient is not nervous/anxious.             Objective    /80   Pulse 57   Temp 97.2  F (36.2  C) (Tympanic)   Resp 14   Ht 1.72 m (5' 7.72\")   Wt 84 kg (185 lb 4 oz)   SpO2 99%   BMI 28.40 kg/m    Body mass index is 28.4 kg/m .  Physical Exam  Vitals signs and nursing note reviewed.   Constitutional:       General: He is not in acute distress.     Appearance: Normal appearance.   HENT:      Head: Normocephalic and atraumatic.      Mouth/Throat:      Mouth: Mucous membranes are moist.      Pharynx: Oropharynx is clear.   Eyes:      Extraocular Movements: Extraocular movements intact.      Pupils: Pupils are equal, round, and reactive to light.   Neck:      Musculoskeletal: " Normal range of motion.   Cardiovascular:      Rate and Rhythm: Normal rate and regular rhythm.      Heart sounds: Normal heart sounds.   Pulmonary:      Effort: Pulmonary effort is normal.      Breath sounds: Normal breath sounds.   Abdominal:      General: Bowel sounds are normal. There is no distension.      Palpations: Abdomen is soft. There is no mass.      Tenderness: There is abdominal tenderness (epigastric ). There is no guarding or rebound.   Musculoskeletal: Normal range of motion.   Skin:     General: Skin is warm and dry.   Neurological:      General: No focal deficit present.      Mental Status: He is alert.   Psychiatric:         Mood and Affect: Mood normal.         Behavior: Behavior normal.            CBC, CMP, lipase, C. difficile, viral/bacterial stool panel, H. pylori pending

## 2021-04-16 NOTE — PATIENT INSTRUCTIONS
For further evaluation of your belching, abdominal pain, and diarrhea, we are completing lab work today.  Additionally, you will have some take home lab supplies.  Once you have your sample, please bring the labs back and we will run them here in clinic.    As discussed, if you develop any severe symptoms such as severe vomiting, fevers, worsening abdominal pain, bloody stools, or any other severe symptoms, please go to the emergency department.    We will be in contact with lab results and next steps.  Please reach out with any questions or concerns.      Patient Education     Diarrhea with Uncertain Cause (Adult)    Diarrhea is when stools are loose and watery. This can be caused by:    Viral infections    Bacterial infections    Food poisoning    Parasites    Irritable bowel syndrome (IBS)    Inflammatory bowel diseases such as ulcerative colitis, Crohn's disease, and celiac disease    Food intolerance, such as to lactose, the sugar found in milk and milk products    Reaction to medicines like antibiotics, laxatives, cancer drugs, and antacids  Along with diarrhea, you may also have:    Abdominal pain and cramping    Nausea and vomiting    Loss of bowel control    Fever and chills    Bloody stools  In some cases, antibiotics may help to treat diarrhea. You may have a stool sample test. This is done to see what is causing your diarrhea, and if antibiotics will help treat it. The results of a stool sample test may take up to 2 days. The healthcare provider may not give you antibiotics until he or she has the stool test results.  Diarrhea can cause dehydration. This is the loss of too much water and other fluids from the body. When this occurs, body fluid must be replaced. This can be done with oral rehydration solutions. Oral rehydration solutions are available at drugstores and grocery stores without a prescription. Sports drinks are not the best choice if you are very dehydrated. They have too much sugar and not  enough electrolytes.  Home care  Follow all instructions given by your healthcare provider. Rest at home for the next 24 hours, or until you feel better. Avoid caffeine, tobacco, and alcohol. These can make diarrhea, cramping, and pain worse.  If taking medicines:    Over-the-counter nausea and diarrhea medicines are generally OK unless you experience fever or blood stool. Check with your doctor first in those circumstances.    You may use acetaminophen or NSAID medicines like ibuprofen or naproxen to reduce pain and fever. Don t use these if you have chronic liver or kidney disease, or ever had a stomach ulcer or gastrointestinal bleeding. Don't use NSAID medicines if you are already taking one for another condition (like arthritis) or are on daily aspirin therapy (such as for heart disease or after a stroke). Talk with your healthcare provider first.    If antibiotics were prescribed, be sure you take them until they are finished. Don t stop taking them even when you feel better. Antibiotics must be taken as a full course.  To prevent the spread of illness:    Remember that washing with soap and water and using alcohol-based  is the best way to prevent the spread of infection. Dry your hands with a single use towel (like a paper towel).    Clean the toilet after each use.    Wash your hands before eating.    Wash your hands before and after preparing food. Keep in mind that people with diarrhea or vomiting should not prepare food for others.    Wash your hands after using cutting boards, countertops, and knives that have been in contact with raw foods.    Wash and then peel fruits and vegetables.    Keep uncooked meats away from cooked and ready-to-eat foods.    Use a food thermometer when cooking. Cook poultry to at least 165 F (74 C). Cook ground meat (beef, veal, pork, lamb) to at least 160 F (71 C). Cook fresh beef, veal, lamb, and pork to at least 145 F (63 C).    Don t eat raw or undercooked eggs  (poached or cynthia side up), poultry, meat, or unpasteurized milk and juices.  Food and drinks  The main goal while treating vomiting or diarrhea is to prevent dehydration. This is done by taking small amounts of liquids often.    Keep in mind that liquids are more important than food right now.    Drink only small amounts of liquids at a time.    Don t force yourself to eat, especially if you are having cramping, vomiting, or diarrhea. Don t eat large amounts at a time, even if you are hungry.    If you eat, avoid fatty, greasy, spicy, or fried foods.    Don t eat dairy foods or drink milk if you have diarrhea. These can make diarrhea worse.  During the first 24 hours you can try:    Oral rehydration solutions.  Sports drinks may be used if you are not too dehydrated and are otherwise healthy.    Soft drinks without caffeine    Ginger ale    Water (plain or flavored)    Decaf tea or coffee    Clear broth, consommé, or bouillon    Gelatin, popsicles, or frozen fruit juice bars  The second 24 hours, if you are feeling better, you can add:    Hot cereal, plain toast, bread, rolls, or crackers    Plain noodles, rice, mashed potatoes, chicken noodle soup, or rice soup    Unsweetened canned fruit (no pineapple)    Bananas  As you recover:    Limit fat intake to less than 15 grams per day. Don t eat margarine, butter, oils, mayonnaise, sauces, gravies, fried foods, peanut butter, meat, poultry, or fish.    Limit fiber. Don t eat raw or cooked vegetables, fresh fruits except bananas, or bran cereals.    Limit caffeine and chocolate.    Limit dairy.    Don t use spices or seasonings except salt.    Go back to your normal diet over time, as you feel better and your symptoms improve.    If the symptoms come back, go back to a simple diet or clear liquids.  Follow-up care  Follow up with your healthcare provider, or as advised. If a stool sample was taken or cultures were done, call the healthcare provider for the results as  instructed.  Call 911  Call 911 if you have any of these symptoms:    Trouble breathing    Confusion    Extreme drowsiness or trouble walking    Loss of consciousness    Rapid heart rate    Chest pain    Stiff neck    Seizure  When to seek medical advice  Call your healthcare provider right away if any of these occur:    Abdominal pain that gets worse    Constant lower right abdominal pain    Continued vomiting and inability to keep liquids down    Diarrhea more than 5 times a day    Blood in vomit or stool    Dark urine or no urine for 8 hours, dry mouth and tongue, tiredness, weakness, or dizziness    Drowsiness    New rash    You don t get better in 2 to 3 days    Fever of 100.4 F (38 C) or higher, or as directed by your healthcare provider  Dr. Tariff last reviewed this educational content on 6/1/2018 2000-2021 [employer]. All rights reserved. This information is not intended as a substitute for professional medical care. Always follow your healthcare professional's instructions.           Patient Education     * Abdominal Pain, Unknown Cause [Male]  Based on your visit today, the exact cause of your abdominal stomach pain is not clear. Your exam and tests do not indicate a dangerous cause at this time. However, the signs of a serious problem may take more time to appear. Although your exam was reassuring today, sometimes early in the course of many conditions, exam and lab tests can appear normal. Therefore, it is important for you to watch for any new symptoms or worsening of your condition.  Causes:  It may not be obvious what caused your symptoms. Pay attention to things that do seem to make your symptoms worse or better and discuss this with your doctor when you follow up.  Diagnosis:  The evaluation of abdominal pain in the emergency department may only require an exam by the doctor or it may include blood, urine or imaging studies, depending on many factors. Sometimes exams and tests can  identify a cause but in many cases, a clear cause is not found. Further testing at follow up visits may help to suggest a clear diagnosis.  Home Care:    Rest as much as possible until your next exam.    Try to avoid any medications (unless otherwise directed by your doctor), foods, activities, or other factors that you may have contributed to your symptoms.    Try to eat foods that you know that you have tolerated well in the past. Certain diets may be recommended for some conditions that cause abdominal pain. However, since the cause of your symptoms may not be clear, discuss your diet more with your primary care provider or specialist for further recommendations.     Eating several small meals per day as opposed to 2 or 3 larger meals may help.    Monitor closely for anything that may make your symptoms worse or better. Pay close attention to symptoms below that may indicate worsening of your condition.  Follow Up And Precautions:  See your doctor or this facility as instructed (or sooner if your symptoms are not improving). In some cases, you may need more testing.  Contact Your Doctor Or Seek Medical Attention  if any of the following occur:    Pain is becoming worse    You are unable to take your medications because of too much vomiting.    Swelling of the abdomen    Fever of 101 F (38.3 C) or higher, or as directed by your health care provider    Blood in vomit or bowel movements (dark red or black color)    Jaundice (yellow color of eyes and skin)    New onset of weakness, dizziness or fainting    New onset of chest, arm, back, neck or jaw pain  For informational purposes only. Not to replace the advice of your health care provider.  Copyright   2018 [facility]. All rights reserved.           Patient Education     Tips to Control Acid Reflux    To control acid reflux, you ll need to make some basic diet and lifestyle changes. The simple steps outlined below may be all you ll need to ease  discomfort.   Watch what you eat    Don't have fatty foods or spicy foods.    Eat fewer acidic foods, such as citrus and tomato-based foods. These can increase symptoms.    Limit drinking alcohol, caffeine, and fizzy beverages. All increase acid reflux.    Try limiting chocolate, peppermint, and spearmint. These can make acid reflux worse in some people.    Watch when you eat    Don't lie down for 3 hours after eating.    Don't snack before going to bed.    Raise your head  Raising your head and upper body by 4 to 6 inches helps limit reflux when you re lying down. Put blocks under the head of your bed frame or a wedge under your mattress to raise it.   Other changes    Lose weight, if you need to    Don t exercise near bedtime    Don't wear tight-fitting clothes    Limit aspirin and ibuprofen    Stop smoking    Manny last reviewed this educational content on 6/1/2019 2000-2021 [employer]. All rights reserved. This information is not intended as a substitute for professional medical care. Always follow your healthcare professional's instructions.

## 2021-04-19 ENCOUNTER — IMMUNIZATION (OUTPATIENT)
Dept: NURSING | Facility: CLINIC | Age: 56
End: 2021-04-19
Payer: OTHER GOVERNMENT

## 2021-04-19 DIAGNOSIS — A04.8 H. PYLORI INFECTION: Primary | ICD-10-CM

## 2021-04-19 LAB — H PYLORI AG STL QL IA: POSITIVE

## 2021-04-19 PROCEDURE — 91300 PR COVID VAC PFIZER DIL RECON 30 MCG/0.3 ML IM: CPT

## 2021-04-19 PROCEDURE — 0001A PR COVID VAC PFIZER DIL RECON 30 MCG/0.3 ML IM: CPT

## 2021-04-19 RX ORDER — METRONIDAZOLE 500 MG/1
500 TABLET ORAL 3 TIMES DAILY
Qty: 42 TABLET | Refills: 0 | Status: SHIPPED | OUTPATIENT
Start: 2021-04-19 | End: 2021-05-03

## 2021-04-19 RX ORDER — CLARITHROMYCIN 250 MG/1
500 TABLET, FILM COATED ORAL 2 TIMES DAILY
Qty: 56 TABLET | Refills: 0 | Status: SHIPPED | OUTPATIENT
Start: 2021-04-19 | End: 2021-05-03

## 2021-04-24 ENCOUNTER — NURSE TRIAGE (OUTPATIENT)
Dept: NURSING | Facility: CLINIC | Age: 56
End: 2021-04-24

## 2021-04-24 ENCOUNTER — TELEPHONE (OUTPATIENT)
Dept: NURSING | Facility: CLINIC | Age: 56
End: 2021-04-24

## 2021-04-24 NOTE — TELEPHONE ENCOUNTER
"This writer called on-call () for medication clarification. Dr Abernathy stated that patient should \"hold medication until he can follow-up with PCP\". Called patient back and went over telephone orders, with repeat back by patient. Patient verbalized understanding and agrees with plan. Transferred to a  for an appointment for appointment Monday or Tuesday    Conchis Elliott RN  [facility] Nurse Advisor  3:20 PM 4/24/2021  "

## 2021-04-24 NOTE — TELEPHONE ENCOUNTER
"Patient called with concerns of side effects from antibiotics given for H. Pylori. States he started feeling\"weak\" 2 days after beginning therapy. Last night he developed headache and vomited X1. Disposition: call on-call for alternative therapy. Please see telephone note.    Conchis Elliott RN    Reason for Disposition    Caller has NON-URGENT medication question about med that PCP prescribed and triager unable to answer question    Additional Information    Negative: MORE THAN A DOUBLE DOSE of a prescription or over-the-counter (OTC) drug    Negative: [1] DOUBLE DOSE (an extra dose or lesser amount) of over-the-counter (OTC) drug AND [2] any symptoms (e.g., dizziness, nausea, pain, sleepiness)    Negative: [1] DOUBLE DOSE (an extra dose or lesser amount) of prescription drug AND [2] any symptoms (e.g., dizziness, nausea, pain, sleepiness)    Negative: Took another person's prescription drug    Negative: [1] DOUBLE DOSE (an extra dose or lesser amount) of prescription drug AND [2] NO symptoms (Exception: a double dose of antibiotics)    Negative: Diabetes drug error or overdose (e.g., insulin or extra dose)    Negative: [1] Request for URGENT new prescription or refill of \"essential\" medication (i.e., likelihood of harm to patient if not taken) AND [2] triager unable to fill per unit policy    Negative: [1] Prescription not at pharmacy AND [2] was prescribed today by PCP    Negative: Pharmacy calling with prescription questions and triager unable to answer question    Negative: Caller has urgent medication question about med that PCP prescribed and triager unable to answer question    Protocols used: MEDICATION QUESTION CALL-A-AH      "

## 2021-04-26 ENCOUNTER — VIRTUAL VISIT (OUTPATIENT)
Dept: FAMILY MEDICINE | Facility: CLINIC | Age: 56
End: 2021-04-26
Payer: COMMERCIAL

## 2021-04-26 DIAGNOSIS — R11.2 NAUSEA AND VOMITING, INTRACTABILITY OF VOMITING NOT SPECIFIED, UNSPECIFIED VOMITING TYPE: ICD-10-CM

## 2021-04-26 DIAGNOSIS — A04.8 H. PYLORI INFECTION: Primary | ICD-10-CM

## 2021-04-26 PROCEDURE — 99214 OFFICE O/P EST MOD 30 MIN: CPT | Mod: 95 | Performed by: PHYSICIAN ASSISTANT

## 2021-04-26 RX ORDER — AMOXICILLIN 500 MG/1
1000 CAPSULE ORAL 2 TIMES DAILY
Qty: 28 CAPSULE | Refills: 0 | Status: SHIPPED | OUTPATIENT
Start: 2021-04-26 | End: 2021-05-03

## 2021-04-26 ASSESSMENT — ENCOUNTER SYMPTOMS
NAUSEA: 1
NERVOUS/ANXIOUS: 0
FEVER: 0
VOMITING: 1
SHORTNESS OF BREATH: 0
LIGHT-HEADEDNESS: 0
ABDOMINAL PAIN: 0
WEAKNESS: 1

## 2021-04-26 NOTE — PROGRESS NOTES
Chaitanya is a 55 year old who is being evaluated via a billable telephone visit.      What phone number would you like to be contacted at? [phone]  How would you like to obtain your AVS? MyChart    Assessment & Plan     H. pylori infection  Nausea and vomiting, intractability of vomiting not specified, unspecified vomiting type  Patient is a 55-year-old male who presents to phone visit due to side effects of medications prescribed for H. pylori including omeprazole, Flagyl, and Biaxin.  Side effects included headache, nausea, vomiting, and weakness.  Patient discussed this with nurse line and stopped medications with symptom resolution.    Discussed changing treatment plans for H. pylori.  Will use clarithromycin-based hybrid therapy.  Patient prescribed amoxicillin.  Recommended continued use of omeprazole.  Recommended avoiding spicy foods, ibuprofen, and alcohol.  Tylenol to be used for discomfort.  Recommended recheck via virtual visit in 7 days.  We will progress therapy at that time patient is tolerating treatment well.    - amoxicillin (AMOXIL) 500 MG capsule; Take 2 capsules (1,000 mg) by mouth 2 times daily for 7 days    See Patient Instructions    Return in about 1 week (around 5/3/2021) for Reevaluation.    Jannet Adams PA-C  [facility] is a 55 year old who presents for the following health issues     HPI     - Follow up after 4/16/2021 visit, dx H. Pylori. He was on course of Omeprazole 20mg BID, Flagyl 500mg tid, and biaxin 500mg bid but notes that he discontinued these over the weekend due to causing emesis, weakness and headaches. He notes that headaches are still intermittent and that he is taking ibuprofen with improvement for this. Emesis and weakness have resolved. He denies constipation, diarrhea, nausea. Stomach tenderness and burping have continued.       Review of Systems   Constitutional: Negative for fever.   HENT: Negative for congestion.     Respiratory: Negative for shortness of breath.    Cardiovascular: Negative for chest pain.   Gastrointestinal: Positive for nausea and vomiting. Negative for abdominal pain.   Neurological: Positive for weakness (generalized ). Negative for light-headedness.   Psychiatric/Behavioral: The patient is not nervous/anxious.             Objective           Vitals:  No vitals were obtained today due to virtual visit.    Physical Exam   healthy, alert and no distress  PSYCH: Alert and oriented times 3; coherent speech, normal   rate and volume, able to articulate logical thoughts, able   to abstract reason, no tangential thoughts, no hallucinations   or delusions  His affect is normal  RESP: No cough, no audible wheezing, able to talk in full sentences  Remainder of exam unable to be completed due to telephone visits            Phone call duration: 8 minutes

## 2021-04-26 NOTE — PATIENT INSTRUCTIONS
Timothy Tavares,    Because of the side effects of your medications, we are going to change her plan for treatment of H. Pylori.    -Please stop with the medications: Flagyl and Biaxin  -Do not use ibuprofen.  For headache or discomfort please use Tylenol.  -Please continue omeprazole 20 mg twice daily  -Please start amoxicillin 1000 mg twice daily for 7 days.  This medication was sent to your pharmacy.  Make sure to take this medication with food to avoid further stomach upset.    Please schedule a phone check in with me next Monday, 7 days from now, for a recheck.    Please reach out with any questions or concerns along the way.  Recheck sooner if you develop any worsening symptoms.    Take care,  Jannet Adams PA-C

## 2021-05-03 ENCOUNTER — VIRTUAL VISIT (OUTPATIENT)
Dept: FAMILY MEDICINE | Facility: CLINIC | Age: 56
End: 2021-05-03

## 2021-05-03 DIAGNOSIS — R14.2 BELCHING: ICD-10-CM

## 2021-05-03 DIAGNOSIS — A04.8 H. PYLORI INFECTION: Primary | ICD-10-CM

## 2021-05-03 PROCEDURE — 99213 OFFICE O/P EST LOW 20 MIN: CPT | Mod: 95 | Performed by: PHYSICIAN ASSISTANT

## 2021-05-03 RX ORDER — AMOXICILLIN 500 MG/1
1000 CAPSULE ORAL 2 TIMES DAILY
Qty: 28 CAPSULE | Refills: 0 | Status: SHIPPED | OUTPATIENT
Start: 2021-05-03 | End: 2021-05-10

## 2021-05-03 NOTE — TELEPHONE ENCOUNTER
Was originally sent for 14 day supply, seen by provider today and looks like he is to continue taking but no refill sent. There are 2 orders for omeprazole, one is 1 capsule daily for 21 days and 1 capsule 2 times daily for 14 days.     Will send to provider to determine dosage and refills.

## 2021-05-03 NOTE — PATIENT INSTRUCTIONS
Timothy Tavares,     For treatment of your H. Pylori    -we have prescribed a 2nd week of Amoxicillin.   -Please continue Omeprazole for another 7 days.  -I would like you to restart the medication clarithromycin and finish this medication.    Please reach out if you have any nausea or vomiting with restarting the medications.  You can take the medications with food which can help.    Please reach out with any questions or concerns.    Take Care,  Jannet Adams PA-C       Patient Education     Understanding H. pylori and Ulcers  Ulcers are sores in the lining of your digestive tract. They were once thought to be caused by too much spicy food, stress, or an anxious personality. It's now known that most ulcers are likely due to infection with bacteria known as Helicobacter pylori (H. pylori). They could also be from using anti-inflammatory medicines such as aspirin and NSAIDs. These include ibuprofen and naproxen.      An ulcer can form in two areas of the digestive tract; the stomach and the duodenum (where the stomach meets the small intestine).   Common ulcer symptoms  Symptoms include:    Burning, cramping, or hunger-like pain in the stomach area, often 1 to 3 hours after a meal or in the middle of the night    Pain that gets better or worse with eating    Nausea or vomiting    Black, tarry, or bloody stools (which means the ulcer is bleeding)  Or you may have no symptoms.  Your evaluation  An evaluation by your healthcare provider can show if you have an ulcer and determine whether it was caused by H. pylori. Your healthcare provider may ask you questions, examine you, and possibly do some tests. These may include:     A special X-ray called an upper gastrointestinal series, to help locate an ulcer. Before the test, you drink a chalky liquid, called barium. This liquid helps the ulcer show up on the X-ray.    An endoscopic exam, done with a long tube with a camera on the end. The tube is passed through your mouth into  your stomach, and allows the healthcare provider to get a closer look at your ulcer. You will be lightly sedated for this procedure. Your healthcare provider can also take a tissue sample to test for H. pylori.    Blood, stool, and breath tests are also available to show whether you have H. pylori in your digestive tract.  Your treatment  To kill H. pylori so your ulcer can heal, your healthcare provider will probably prescribe antibiotics. Other ulcer medicines that help reduce stomach acid may also be prescribed as well. Testing after treatment may be recommended to be sure the H. pylori infection is gone. Usually, killing H. pylori helps keep the ulcer from returning. However, you can develop ulcers more than once in your lifetime.    [employer] last reviewed this educational content on 6/1/2019 2000-2021 [employer]. All rights reserved. This information is not intended as a substitute for professional medical care. Always follow your healthcare professional's instructions.

## 2021-05-03 NOTE — PROGRESS NOTES
Chaitanya is a 55 year old who is being evaluated via a billable telephone visit.      What phone number would you like to be contacted at? [phone]  How would you like to obtain your AVS? MyChart    Assessment & Plan     H. pylori infection  Patient is a 55-year-old male who presents virtual visit for recheck of H. pylori treatment plan.  Patient has been compliant with amoxicillin as well as omeprazole.  Recommended continuing amoxicillin and omeprazole for 7 more days.  Patient to restart clarithromycin and complete entire 14-day course.  Patient to follow-up if he develops any new or worrisome symptoms including nausea/vomiting, abdominal pain, or any other worrisome symptoms.  - amoxicillin (AMOXIL) 500 MG capsule; Take 2 capsules (1,000 mg) by mouth 2 times daily for 7 days      See Patient Instructions    Return in about 1 week (around 5/10/2021), or if symptoms worsen or fail to improve.    ADOLPH Ramos [facility]   Chaitanya is a 55 year old who presents for the following health issues     HPI     Follow up after 4/26/2021 virtual visit. PMHx H. Pylori. He notes that since last visit abdominal pain and tenderness has resolved. Burping has continued. He is on course of amoxicillin and omeprazole with improvement. He denies nausea, emesis, diarrhea, headaches, body weakness, urinary sx, fevers, other cold sx. Appetite is doing well and he is drinking fluids, urine is clear. He is sleeping well at night.          Objective           Vitals:  No vitals were obtained today due to virtual visit.    Physical Exam   healthy, alert and no distress  PSYCH: Alert and oriented times 3; coherent speech, normal   rate and volume, able to articulate logical thoughts, able   to abstract reason, no tangential thoughts, no hallucinations   or delusions  His affect is normal  RESP: No cough, no audible wheezing, able to talk in full sentences  Remainder of exam unable to be completed  due to telephone visits          Phone call duration: 9 minutes

## 2021-05-10 ENCOUNTER — IMMUNIZATION (OUTPATIENT)
Dept: NURSING | Facility: CLINIC | Age: 56
End: 2021-05-10
Attending: INTERNAL MEDICINE
Payer: OTHER GOVERNMENT

## 2021-05-10 PROCEDURE — 91300 PR COVID VAC PFIZER DIL RECON 30 MCG/0.3 ML IM: CPT

## 2021-05-10 PROCEDURE — 0002A PR COVID VAC PFIZER DIL RECON 30 MCG/0.3 ML IM: CPT

## 2021-06-01 ENCOUNTER — OFFICE VISIT (OUTPATIENT)
Dept: FAMILY MEDICINE | Facility: CLINIC | Age: 56
End: 2021-06-01

## 2021-06-01 VITALS
TEMPERATURE: 97.9 F | BODY MASS INDEX: 27.34 KG/M2 | DIASTOLIC BLOOD PRESSURE: 80 MMHG | SYSTOLIC BLOOD PRESSURE: 123 MMHG | HEART RATE: 67 BPM | OXYGEN SATURATION: 98 % | RESPIRATION RATE: 20 BRPM | WEIGHT: 180.4 LBS | HEIGHT: 68 IN

## 2021-06-01 DIAGNOSIS — Z00.00 ROUTINE GENERAL MEDICAL EXAMINATION AT A HEALTH CARE FACILITY: Primary | ICD-10-CM

## 2021-06-01 DIAGNOSIS — E78.5 HYPERLIPIDEMIA LDL GOAL <160: ICD-10-CM

## 2021-06-01 DIAGNOSIS — K21.00 GASTROESOPHAGEAL REFLUX DISEASE WITH ESOPHAGITIS WITHOUT HEMORRHAGE: ICD-10-CM

## 2021-06-01 LAB
CHOLEST SERPL-MCNC: 255 MG/DL
HDLC SERPL-MCNC: 52 MG/DL
LDLC SERPL CALC-MCNC: 173 MG/DL
NONHDLC SERPL-MCNC: 203 MG/DL
TRIGL SERPL-MCNC: 148 MG/DL

## 2021-06-01 PROCEDURE — 36415 COLL VENOUS BLD VENIPUNCTURE: CPT | Performed by: PHYSICIAN ASSISTANT

## 2021-06-01 PROCEDURE — 80061 LIPID PANEL: CPT | Performed by: PHYSICIAN ASSISTANT

## 2021-06-01 PROCEDURE — 99214 OFFICE O/P EST MOD 30 MIN: CPT | Mod: 25 | Performed by: PHYSICIAN ASSISTANT

## 2021-06-01 PROCEDURE — 99396 PREV VISIT EST AGE 40-64: CPT | Performed by: PHYSICIAN ASSISTANT

## 2021-06-01 RX ORDER — FAMOTIDINE 40 MG/1
40 TABLET, FILM COATED ORAL
Qty: 90 TABLET | Refills: 1 | Status: SHIPPED | OUTPATIENT
Start: 2021-06-01

## 2021-06-01 ASSESSMENT — MIFFLIN-ST. JEOR: SCORE: 1641.76

## 2021-06-01 NOTE — PROGRESS NOTES
3  SUBJECTIVE:   CC: Chaitanya Ovalles is an 55 year old male who presents for preventive health visit.       Patient has been advised of split billing requirements and indicates understanding: Yes  Healthy Habits:  Do you get at least three servings of calcium containing foods daily (dairy, green leafy vegetables, etc.)? No  Amount of exercise or daily activities, outside of work: 30-45 min  5 days a week  Problems taking medications regularly Yes stopped all medications and made lifestyle changes  Medication side effects: No  Have you had an eye exam in the past two years? no  Do you see a dentist twice per year? yes  Do you have sleep apnea, excessive snoring or daytime drowsiness?no  Recheck of his gerd. Still some mild symptoms.  Some water brash.    History of hyperlipidemia. Didn't take lipitor. Wants to try doing it with diet and exercise . Has made some changes in his diet.  No fhx of CAD  Today's PHQ-2 Score:   PHQ-2 ( 1999 [employer]) 6/1/2021 4/1/2021   Q1: Little interest or pleasure in doing things 0 0   Q2: Feeling down, depressed or hopeless 0 0   PHQ-2 Score 0 0   Q1: Little interest or pleasure in doing things - -   Q2: Feeling down, depressed or hopeless - -   PHQ-2 Score - -       Abuse: Current or Past(Physical, Sexual or Emotional)- No  Do you feel safe in your environment? Yes    Have you ever done Advance Care Planning? (For example, a Health Directive, POLST, or a discussion with a medical provider or your loved ones about your wishes): Yes, patient states has an Advance Care Planning document and will bring a copy to the clinic.    Social History     Tobacco Use     Smoking status: Never Smoker     Smokeless tobacco: Never Used   Substance Use Topics     Alcohol use: Yes     Comment: wine on weekends     If you drink alcohol do you typically have >3 drinks per day or >7 drinks per week? Not Applicable                      Last PSA:   PSA   Date Value Ref Range Status   03/10/2021 10.30 (H) 0 -  4 ug/L Final     Comment:     Assay Method:  Chemiluminescence using Siemens Vista analyzer       Reviewed orders with patient. Reviewed health maintenance and updated orders accordingly - Yes  Lab work is in process  Labs reviewed in EPIC  BP Readings from Last 3 Encounters:   06/01/21 123/80   04/16/21 124/80   01/13/21 132/85    Wt Readings from Last 3 Encounters:   06/01/21 81.8 kg (180 lb 6.4 oz)   04/16/21 84 kg (185 lb 4 oz)   01/13/21 84.8 kg (187 lb)                  Patient Active Problem List   Diagnosis     Erectile dysfunction     HYPERLIPIDEMIA LDL GOAL <130     Enlarged prostate     Past Surgical History:   Procedure Laterality Date     COLONOSCOPY N/A 12/13/2017    Procedure: COLONOSCOPY;  Colon;  Surgeon: Latonya Carias MD;  Location:  OR     CYSTOSCOPY, TRANSURETHRAL RESECTION (TUR) PROSTATE, COMBINED N/A 9/18/2017    Procedure: COMBINED CYSTOSCOPY, TRANSURETHRAL RESECTION (TUR) PROSTATE;  Cystoscopy, Transurethral Resection of the Prostate;  Surgeon: Nadine Mena MD;  Location:  OR       Social History     Tobacco Use     Smoking status: Never Smoker     Smokeless tobacco: Never Used   Substance Use Topics     Alcohol use: Yes     Comment: wine on weekends     Family History   Problem Relation Age of Onset     Thyroid Disease Sister          Current Outpatient Medications   Medication Sig Dispense Refill     famotidine (PEPCID) 40 MG tablet Take 1 tablet (40 mg) by mouth nightly as needed for heartburn 90 tablet 1     sildenafil (REVATIO) 20 MG tablet Take 1 tablet (20 mg) by mouth daily (Patient not taking: Reported on 6/1/2021) 30 tablet 2     No Known Allergies  Recent Labs   Lab Test 04/16/21  0915 09/30/20  1726 02/20/20  1000 09/03/19  1619   *  --  172* 127*   HDL 48  --  55 54   TRIG 101  --  98 157*   ALT 31  --   --   --    CR 0.94  --  0.81 0.88   GFRESTIMATED >90 78 >90 >90   GFRESTBLACK >90 >90 >90 >90   POTASSIUM 4.1  --  4.6 4.2   TSH  --   --   --  1.76  "       Reviewed and updated as needed this visit by clinical staff  Tobacco  Allergies  Meds   Med Hx  Surg Hx  Fam Hx  Soc Hx        Reviewed and updated as needed this visit by Provider                Past Medical History:   Diagnosis Date     BPH (benign prostatic hypertrophy) with urinary obstruction      Elevated PSA       Past Surgical History:   Procedure Laterality Date     COLONOSCOPY N/A 12/13/2017    Procedure: COLONOSCOPY;  Colon;  Surgeon: Latonya Carias MD;  Location: [facility] OR     CYSTOSCOPY, TRANSURETHRAL RESECTION (TUR) PROSTATE, COMBINED N/A 9/18/2017    Procedure: COMBINED CYSTOSCOPY, TRANSURETHRAL RESECTION (TUR) PROSTATE;  Cystoscopy, Transurethral Resection of the Prostate;  Surgeon: Nadine Mena MD;  Location: [facility] OR       ROS:  CONSTITUTIONAL: NEGATIVE for fever, chills, change in weight  INTEGUMENTARY/SKIN: NEGATIVE for worrisome rashes, moles or lesions  EYES: NEGATIVE for vision changes or irritation  ENT: NEGATIVE for ear, mouth and throat problems  RESP: NEGATIVE for significant cough or SOB  CV: NEGATIVE for chest pain, palpitations or peripheral edema  GI: NEGATIVE for nausea, abdominal pain, heartburn, or change in bowel habits   male: negative for dysuria, hematuria, decreased urinary stream, erectile dysfunction, urethral discharge  MUSCULOSKELETAL: NEGATIVE for significant arthralgias or myalgia  NEURO: NEGATIVE for weakness, dizziness or paresthesias  PSYCHIATRIC: NEGATIVE for changes in mood or affect    OBJECTIVE:   /80   Pulse 67   Temp 97.9  F (36.6  C) (Tympanic)   Resp 20   Ht 1.734 m (5' 8.25\")   Wt 81.8 kg (180 lb 6.4 oz)   SpO2 98%   BMI 27.23 kg/m    EXAM:  GENERAL: healthy, alert and no distress  EYES: Eyes grossly normal to inspection, PERRL and conjunctivae and sclerae normal  HENT: ear canals and TM's normal, nose and mouth without ulcers or lesions  NECK: no adenopathy, no asymmetry, masses, or scars and thyroid normal to " "palpation  RESP: lungs clear to auscultation - no rales, rhonchi or wheezes  CV: regular rate and rhythm, normal S1 S2, no S3 or S4, no murmur, click or rub, no peripheral edema and peripheral pulses strong  ABDOMEN: soft, nontender, no hepatosplenomegaly, no masses and bowel sounds normal  MS: no gross musculoskeletal defects noted, no edema  SKIN: no suspicious lesions or rashes  NEURO: Normal strength and tone, mentation intact and speech normal  PSYCH: mentation appears normal, affect normal/bright    Diagnostic Test Results:  Labs reviewed in Epic    ASSESSMENT/PLAN:       ICD-10-CM    1. Routine general medical examination at a health care facility  Z00.00    2. Screening for HIV (human immunodeficiency virus)  Z11.4    3. Need for hepatitis C screening test  Z11.59    4. Gastroesophageal reflux disease with esophagitis without hemorrhage  K21.00 famotidine (PEPCID) 40 MG tablet   5. Hyperlipidemia LDL goal <160  E78.5 Lipid panel reflex to direct LDL Fasting     work on lifestyle modification    Patient has been advised of split billing requirements and indicates understanding: Yes  COUNSELING:  Reviewed preventive health counseling, as reflected in patient instructions       Regular exercise       Healthy diet/nutrition    Estimated body mass index is 27.23 kg/m  as calculated from the following:    Height as of this encounter: 1.734 m (5' 8.25\").    Weight as of this encounter: 81.8 kg (180 lb 6.4 oz).    Weight management plan: Discussed healthy diet and exercise guidelines    He reports that he has never smoked. He has never used smokeless tobacco.      Counseling Resources:  ATP IV Guidelines  Pooled Cohorts Equation Calculator  FRAX Risk Assessment  ICSI Preventive Guidelines  Dietary Guidelines for Americans, 2010  USDA's MyPlate  ASA Prophylaxis  Lung CA Screening    ADOLPH [facility] LILIYA  "

## 2021-07-10 RX ORDER — ATORVASTATIN CALCIUM 20 MG/1
20 TABLET, FILM COATED ORAL DAILY
Qty: 90 TABLET | Refills: 1 | Status: SHIPPED | OUTPATIENT
Start: 2021-07-10

## 2021-09-18 ENCOUNTER — HEALTH MAINTENANCE LETTER (OUTPATIENT)
Age: 56
End: 2021-09-18

## 2021-11-19 DIAGNOSIS — N52.9 ERECTILE DYSFUNCTION, UNSPECIFIED ERECTILE DYSFUNCTION TYPE: ICD-10-CM

## 2021-11-23 RX ORDER — SILDENAFIL CITRATE 20 MG/1
20 TABLET ORAL DAILY
Qty: 90 TABLET | Refills: 0 | Status: SHIPPED | OUTPATIENT
Start: 2021-11-23 | End: 2022-02-04

## 2021-11-23 NOTE — TELEPHONE ENCOUNTER
Sildenafil Citrate Oral Tablet 20 MG  Last Written Prescription Date:  10/6/2020  Last Fill Quantity: 30,   # refills: 2  Last Office Visit : 4/1/2021  Future Office visit:  None    Routing refill request to provider for review/approval because:  Gaps in refills.   Last filled Oct 2002?   Refer to clinic for review and refills per Providers orders.       Ara Leigh RN  Central Triage Red Flags/Med Refills

## 2022-02-04 DIAGNOSIS — N52.9 ERECTILE DYSFUNCTION, UNSPECIFIED ERECTILE DYSFUNCTION TYPE: ICD-10-CM

## 2022-02-04 RX ORDER — SILDENAFIL CITRATE 20 MG/1
20 TABLET ORAL DAILY
Qty: 90 TABLET | Refills: 1 | Status: SHIPPED | OUTPATIENT
Start: 2022-02-04

## 2022-08-20 ENCOUNTER — HEALTH MAINTENANCE LETTER (OUTPATIENT)
Age: 57
End: 2022-08-20

## 2022-11-20 ENCOUNTER — HEALTH MAINTENANCE LETTER (OUTPATIENT)
Age: 57
End: 2022-11-20

## 2023-02-10 DIAGNOSIS — N52.9 ERECTILE DYSFUNCTION, UNSPECIFIED ERECTILE DYSFUNCTION TYPE: ICD-10-CM

## 2023-05-11 RX ORDER — SILDENAFIL CITRATE 20 MG/1
TABLET ORAL
Qty: 90 TABLET | Refills: 0 | OUTPATIENT
Start: 2023-05-11

## 2023-09-10 ENCOUNTER — HEALTH MAINTENANCE LETTER (OUTPATIENT)
Age: 58
End: 2023-09-10

## 2024-10-25 NOTE — MR AVS SNAPSHOT
After Visit Summary   2017    Chaitanya Ovalles    MRN: [id number]           Patient Information     Date Of Birth          1965        Visit Information        Provider Department      2017 3:30 PM Rn, [facility]        Care Instructions    Preparing for Your Surgery      Name:  Chaitanya Ovalles   MRN:  [id number]   :  1965   Today's Date:  2017     Arriving for surgery:  Surgery date:  17  Surgery time:  5:15 PM   Arrival time:  3:15 pm  Please come to:       [facility] [address]  [address] [place]  [address]    -   parking is available in front of the hospital from 5:15 am to 8:00 pm    -  Stop at the Information Desk in the lobby    -   Inform the information person that you are here for surgery. An escort to 3c will be provided. If you would not like an escort, please proceed to 3C on the 3rd floor. [phone]     What can I eat or drink?  -  You may have solid food or milk products until 8 hours prior to your surgery. (9:15 am)  -  You may have water, apple juice or 7up/Sprite until 2 hours prior to your surgery. (3:15 pm)    Which medicines can I take?  -  Do NOT take Aspirin for one week or Ibuprofen for 24 hrs BEFORE SURGERY      -  Please take these medications the day of surgery: none      How do I prepare myself?  -  Take two showers: one the night before surgery; and one the morning of surgery.         Use Scrubcare or Hibiclens to wash from neck down.  You may use your own shampoo and conditioner. No other hair products.   -  Do NOT use lotion, powder, deodorant, or antiperspirant the day of your surgery.  -  Do NOT wear any makeup, fingernail polish or jewelry.  -  Do not bring your own medications to the hospital, except for inhalers and eye drops.  -  Bring your ID and insurance card.    Questions or Concerns:  If you have questions or concerns, please call  Pt called and requested refill of his medication. He did not know the name of it but said its a generic form of viagra. Please review and contact pt if needed.   the  Preoperative Assessment Center, Monday-Friday 7AM-7PM:  [phone]                AFTER YOUR SURGERY  Breathing exercises   Breathing exercises help you recover faster. Take deep breaths and let the air out slowly. This will:     Help you wake up after surgery.    Help prevent complications like pneumonia.  Preventing complications will help you go home sooner.   We may give you a breathing device (incentive spirometer) to encourage you to breathe deeply.   Nausea and vomiting   You may feel sick to your stomach after surgery; if so, let your nurse know.    Pain control:  After surgery, you may have pain. Our goal is to help you manage your pain. Pain medicine will help you feel comfortable enough to do activities that will help you heal.  These activities may include breathing exercises, walking and physical therapy.   To help your health care team treat your pain we will ask: 1) If you have pain  2) where it is located 3) describe your pain in your words  Methods of pain control include medications given by mouth, vein or by nerve block for some surgeries.  Sequential Compression Device (SCD) or Pneumo Boots:  You may need to wear SCD S on your legs or feet. These are wraps connected to a machine that pumps in air and releases it. The repeated pumping helps prevent blood clots from forming.           Follow-ups after your visit        Your next 10 appointments already scheduled     Sep 07, 2017  3:30 PM CDT   (Arrive by 3:15 PM)   PAC RN ASSESSMENT with Herb Pac Rn   [facility] ([facility])    [address] [place] [id number]   [phone]            Sep 07, 2017  4:00 PM CDT   (Arrive by 3:45 PM)   PAC Anesthesia Consult with Herb Pac Anesthesiologist   [facility] [place] ([facility])    [address] [place] [id number]   [phone]            Sep 07, 2017   4:15 PM CDT   LAB with  LAB   [facility] ([facility])    [address] [id number] [phone]           Patient must bring picture ID. Patient should be prepared to give a urine specimen  Please do not eat 10-12 hours before your appointment if you are coming in fasting for labs on lipids, cholesterol, or glucose (sugar). Pregnant women should follow their Care Team instructions. Water with medications is okay. Do not drink coffee or other fluids. If you have concerns about taking  your medications, please ask at office or if scheduling via Say2me, send a message by clicking on Secure Messaging, Message Your Care Team.            Sep 18, 2017   Procedure with Nadine Mena MD   [facility] [place], Same Day Surgery (--)    [address] [facility] [id number] [phone]            Oct 13, 2017  2:15 PM CDT   (Arrive by 2:00 PM)   Post-Op with Nadine Mena MD   [facility] ([facility])    [address] [place] [id number] [phone]              Who to contact     Please call your clinic at [phone] to:    Ask questions about your health    Make or cancel appointments    Discuss your medicines    Learn about your test results    Speak to your doctor   If you have compliments or concerns about an experience at your clinic, or if you wish to file a complaint, please contact [facility] Patient Relations at [phone] or email us at [email or web address]         Additional Information About Your Visit        [employer] Information     Say2me gives you secure access to your electronic health record. If you see a primary care provider, you can also send messages to your care team and make appointments. If you have questions, please call your primary care clinic.  If you do not have a primary care  provider, please call [phone] and they will assist you.      [employer] is an electronic gateway that provides easy, online access to your medical records. With [employer], you can request a clinic appointment, read your test results, renew a prescription or communicate with your care team.     To access your existing account, please contact your [facility] or call [phone] for assistance.        Care EveryWhere ID     This is your Care EveryWhere ID. This could be used by other organizations to access your [place] medical records  [id number]         Blood Pressure from Last 3 Encounters:   09/07/17 132/78   07/07/17 134/79   06/28/17 152/79    Weight from Last 3 Encounters:   09/07/17 83.7 kg (184 lb 8 oz)   07/07/17 84.1 kg (185 lb 8 oz)   06/28/17 80.7 kg (178 lb)              Today, you had the following     No orders found for display       Primary Care Provider Office Phone # Fax #    Larry Mcdaniel [phone]       [facility] [address]  [facility] [address]        Equal Access to Services     [facility]: Hadii aad ku hadasho Soomaali, waaxda luqadaha, qaybta kaalmada adeegyada, lasha hillmanin haysuleman adefrancisco byers . So [facility] [phone].    ATENCIÓN: Si habla español, tiene a pimentel disposición servicios gratadamsos de asistencia lingüística. Llame al [phone].    We comply with applicable federal civil rights laws and [place] laws. We do not discriminate on the basis of race, color, national origin, age, disability sex, sexual orientation or gender identity.            Thank you!     Thank you for choosing [facility]  for your care. Our goal is always to provide you with excellent care. Hearing back from our patients is one way we can continue to improve our services. Please take a few minutes to complete the written survey that you may receive in the mail after your visit with us. Thank you!              Your Updated Medication List - Protect others around you: Learn how to safely use, store and throw away your medicines at www.disposemymeds.org.      Notice  As of 9/7/2017  3:11 PM    You have not been prescribed any medications.

## (undated) DEVICE — SOL WATER IRRIG 1000ML BOTTLE 2F7114

## (undated) DEVICE — ESU ELEC CUTTING LOOP 24/26FR .35MM BIPOLAR 27040GP1-S

## (undated) DEVICE — WIPE PREMOIST CLEANSING WASHCLOTHS 7988

## (undated) DEVICE — SUCTION MANIFOLD DORNOCH ULTRA CART UL-CL500

## (undated) DEVICE — TAPE DURAPORE 3" SILK 1538-3

## (undated) DEVICE — BAG URINARY DRAIN LUBRISIL IC 4000ML LF 253509A

## (undated) DEVICE — ESU GROUND PAD ADULT W/CORD E7507

## (undated) DEVICE — SOL NACL 0.9% IRRIG 1000ML BOTTLE 2F7124

## (undated) DEVICE — CATH FOLEY 3WAY 24FR 30ML LATEX 0167SI24

## (undated) DEVICE — SPECIMEN CONTAINER 3OZ W/FORMALIN 59901

## (undated) DEVICE — JELLY LUBRICATING SURGILUBE 2OZ TUBE

## (undated) DEVICE — PACK CYSTO UMMC CUSTOM

## (undated) DEVICE — ENDO FORCEP ENDOJAW BIOPSY 2.8MMX230CM FB-220U

## (undated) DEVICE — SPECIMEN CONTAINER 5OZ STERILE 2600SA

## (undated) DEVICE — GUIDEWIRE SENSOR DUAL FLEX ANG 0.035"X150CM M0066703010

## (undated) DEVICE — LINEN TOWEL PACK X5 5464

## (undated) DEVICE — ENDO CONNECTOR ENDOGATOR AUX WATER JET FOR OLYMPUS SCOPE

## (undated) DEVICE — ENDO CAP AND TUBING STERILE FOR ENDOGATOR  100130

## (undated) DEVICE — EVACUATOR BLADDER UROVAC LATEX M0067301250

## (undated) DEVICE — KIT ENDO FIRST STEP DISINFECTANT 200ML W/POUCH EP-4

## (undated) DEVICE — SYR 70ML TOOMEY 041170

## (undated) DEVICE — CONNECTOR WATER VALVE PERFUSION PACK STR 020272801

## (undated) DEVICE — SOL NACL 0.9% IRRIG 3000ML BAG 2B7477

## (undated) DEVICE — SUCTION MANIFOLD NEPTUNE 2 SYS 4 PORT 0702-020-000

## (undated) DEVICE — PAD CHUX UNDERPAD 23X24" 7136

## (undated) RX ORDER — LIDOCAINE HYDROCHLORIDE 10 MG/ML
INJECTION, SOLUTION INFILTRATION; PERINEURAL
Status: DISPENSED
Start: 2017-06-28

## (undated) RX ORDER — SODIUM CHLORIDE, SODIUM LACTATE, POTASSIUM CHLORIDE, CALCIUM CHLORIDE 600; 310; 30; 20 MG/100ML; MG/100ML; MG/100ML; MG/100ML
INJECTION, SOLUTION INTRAVENOUS
Status: DISPENSED
Start: 2017-09-19

## (undated) RX ORDER — PROPOFOL 10 MG/ML
INJECTION, EMULSION INTRAVENOUS
Status: DISPENSED
Start: 2017-09-18

## (undated) RX ORDER — FENTANYL CITRATE 50 UG/ML
INJECTION, SOLUTION INTRAMUSCULAR; INTRAVENOUS
Status: DISPENSED
Start: 2017-09-18

## (undated) RX ORDER — CEFAZOLIN SODIUM 2 G/100ML
INJECTION, SOLUTION INTRAVENOUS
Status: DISPENSED
Start: 2017-09-18

## (undated) RX ORDER — LIDOCAINE HYDROCHLORIDE 20 MG/ML
JELLY TOPICAL
Status: DISPENSED
Start: 2020-09-24

## (undated) RX ORDER — FENTANYL CITRATE 50 UG/ML
INJECTION, SOLUTION INTRAMUSCULAR; INTRAVENOUS
Status: DISPENSED
Start: 2017-12-13

## (undated) RX ORDER — HYDROMORPHONE HYDROCHLORIDE 1 MG/ML
INJECTION, SOLUTION INTRAMUSCULAR; INTRAVENOUS; SUBCUTANEOUS
Status: DISPENSED
Start: 2017-09-18

## (undated) RX ORDER — ONDANSETRON 2 MG/ML
INJECTION INTRAMUSCULAR; INTRAVENOUS
Status: DISPENSED
Start: 2017-09-18

## (undated) RX ORDER — GENTAMICIN 40 MG/ML
INJECTION, SOLUTION INTRAMUSCULAR; INTRAVENOUS
Status: DISPENSED
Start: 2017-06-28

## (undated) RX ORDER — ROCURONIUM BROMIDE 50 MG/5 ML
SYRINGE (ML) INTRAVENOUS
Status: DISPENSED
Start: 2017-09-18